# Patient Record
Sex: MALE | Race: WHITE | NOT HISPANIC OR LATINO | Employment: OTHER | URBAN - METROPOLITAN AREA
[De-identification: names, ages, dates, MRNs, and addresses within clinical notes are randomized per-mention and may not be internally consistent; named-entity substitution may affect disease eponyms.]

---

## 2018-01-01 ENCOUNTER — APPOINTMENT (OUTPATIENT)
Dept: INTERVENTIONAL RADIOLOGY/VASCULAR | Facility: HOSPITAL | Age: 73
DRG: 248 | End: 2018-01-01
Attending: INTERNAL MEDICINE
Payer: MEDICARE

## 2018-01-01 ENCOUNTER — APPOINTMENT (EMERGENCY)
Dept: INTERVENTIONAL RADIOLOGY/VASCULAR | Facility: HOSPITAL | Age: 73
DRG: 248 | End: 2018-01-01
Payer: MEDICARE

## 2018-01-01 ENCOUNTER — APPOINTMENT (EMERGENCY)
Dept: RADIOLOGY | Facility: HOSPITAL | Age: 73
DRG: 248 | End: 2018-01-01
Payer: MEDICARE

## 2018-01-01 ENCOUNTER — APPOINTMENT (INPATIENT)
Dept: RADIOLOGY | Facility: HOSPITAL | Age: 73
DRG: 248 | End: 2018-01-01
Payer: MEDICARE

## 2018-01-01 ENCOUNTER — HOSPITAL ENCOUNTER (INPATIENT)
Facility: HOSPITAL | Age: 73
LOS: 2 days | DRG: 248 | End: 2018-04-30
Attending: EMERGENCY MEDICINE | Admitting: ANESTHESIOLOGY
Payer: MEDICARE

## 2018-01-01 ENCOUNTER — APPOINTMENT (INPATIENT)
Dept: CT IMAGING | Facility: HOSPITAL | Age: 73
DRG: 248 | End: 2018-01-01
Payer: MEDICARE

## 2018-01-01 VITALS
BODY MASS INDEX: 32 KG/M2 | DIASTOLIC BLOOD PRESSURE: 65 MMHG | WEIGHT: 236.3 LBS | HEIGHT: 72 IN | RESPIRATION RATE: 16 BRPM | OXYGEN SATURATION: 62 % | SYSTOLIC BLOOD PRESSURE: 118 MMHG | HEART RATE: 117 BPM | TEMPERATURE: 95.7 F

## 2018-01-01 DIAGNOSIS — I21.3 STEMI (ST ELEVATION MYOCARDIAL INFARCTION) (HCC): ICD-10-CM

## 2018-01-01 DIAGNOSIS — I46.9 CARDIOPULMONARY ARREST (HCC): Primary | ICD-10-CM

## 2018-01-01 DIAGNOSIS — I46.9 CARDIAC ARREST (HCC): ICD-10-CM

## 2018-01-01 DIAGNOSIS — G40.901 STATUS EPILEPTICUS (HCC): ICD-10-CM

## 2018-01-01 DIAGNOSIS — G25.3 MYOCLONUS: ICD-10-CM

## 2018-01-01 LAB
ALBUMIN SERPL BCP-MCNC: 3.2 G/DL (ref 3.5–5)
ALBUMIN SERPL BCP-MCNC: 3.3 G/DL (ref 3.5–5)
ALP SERPL-CCNC: 103 U/L (ref 46–116)
ALP SERPL-CCNC: 103 U/L (ref 46–116)
ALT SERPL W P-5'-P-CCNC: 120 U/L (ref 12–78)
ALT SERPL W P-5'-P-CCNC: 91 U/L (ref 12–78)
ANION GAP BLD CALC-SCNC: 21 MMOL/L (ref 4–13)
ANION GAP SERPL CALCULATED.3IONS-SCNC: 11 MMOL/L (ref 4–13)
ANION GAP SERPL CALCULATED.3IONS-SCNC: 14 MMOL/L (ref 4–13)
ANION GAP SERPL CALCULATED.3IONS-SCNC: 14 MMOL/L (ref 4–13)
ANION GAP SERPL CALCULATED.3IONS-SCNC: 20 MMOL/L (ref 4–13)
APTT PPP: 25 SECONDS (ref 23–35)
ARTERIAL PATENCY WRIST A: YES
AST SERPL W P-5'-P-CCNC: 223 U/L (ref 5–45)
AST SERPL W P-5'-P-CCNC: 88 U/L (ref 5–45)
ATRIAL RATE: 108 BPM
ATRIAL RATE: 66 BPM
ATRIAL RATE: 70 BPM
BASE EXCESS BLDA CALC-SCNC: -6.2 MMOL/L
BASE EXCESS BLDA CALC-SCNC: -8 MMOL/L (ref -2–3)
BASE EXCESS BLDA CALC-SCNC: -8.7 MMOL/L
BASE EXCESS BLDA CALC-SCNC: -9 MMOL/L (ref -2–3)
BASOPHILS # BLD AUTO: 0.02 THOUSANDS/ΜL (ref 0–0.1)
BASOPHILS # BLD AUTO: 0.09 THOUSANDS/ΜL (ref 0–0.1)
BASOPHILS # BLD MANUAL: 0 THOUSAND/UL (ref 0–0.1)
BASOPHILS NFR BLD AUTO: 0 % (ref 0–1)
BASOPHILS NFR BLD AUTO: 1 % (ref 0–1)
BASOPHILS NFR MAR MANUAL: 0 % (ref 0–1)
BILIRUB SERPL-MCNC: 0.5 MG/DL (ref 0.2–1)
BILIRUB SERPL-MCNC: 0.6 MG/DL (ref 0.2–1)
BODY TEMPERATURE: 95.5 DEGREES FEHRENHEIT
BUN BLD-MCNC: 22 MG/DL (ref 5–25)
BUN SERPL-MCNC: 20 MG/DL (ref 5–25)
BUN SERPL-MCNC: 22 MG/DL (ref 5–25)
BUN SERPL-MCNC: 23 MG/DL (ref 5–25)
BUN SERPL-MCNC: 23 MG/DL (ref 5–25)
CA-I BLD-SCNC: 1.02 MMOL/L (ref 1.12–1.32)
CA-I BLD-SCNC: 1.03 MMOL/L (ref 1.12–1.32)
CA-I BLD-SCNC: 1.12 MMOL/L (ref 1.12–1.32)
CA-I BLD-SCNC: 1.13 MMOL/L (ref 1.12–1.32)
CA-I BLD-SCNC: 1.21 MMOL/L (ref 1.12–1.32)
CA-I BLD-SCNC: 1.24 MMOL/L (ref 1.12–1.32)
CALCIUM SERPL-MCNC: 7.6 MG/DL (ref 8.3–10.1)
CALCIUM SERPL-MCNC: 7.9 MG/DL (ref 8.3–10.1)
CALCIUM SERPL-MCNC: 8.2 MG/DL (ref 8.3–10.1)
CALCIUM SERPL-MCNC: 8.3 MG/DL (ref 8.3–10.1)
CHLORIDE BLD-SCNC: 108 MMOL/L (ref 100–108)
CHLORIDE SERPL-SCNC: 106 MMOL/L (ref 100–108)
CHLORIDE SERPL-SCNC: 108 MMOL/L (ref 100–108)
CHLORIDE SERPL-SCNC: 109 MMOL/L (ref 100–108)
CHLORIDE SERPL-SCNC: 109 MMOL/L (ref 100–108)
CO2 SERPL-SCNC: 16 MMOL/L (ref 21–32)
CO2 SERPL-SCNC: 19 MMOL/L (ref 21–32)
CREAT BLD-MCNC: 1.6 MG/DL (ref 0.6–1.3)
CREAT SERPL-MCNC: 1.32 MG/DL (ref 0.6–1.3)
CREAT SERPL-MCNC: 1.48 MG/DL (ref 0.6–1.3)
CREAT SERPL-MCNC: 1.57 MG/DL (ref 0.6–1.3)
CREAT SERPL-MCNC: 2 MG/DL (ref 0.6–1.3)
EOSINOPHIL # BLD AUTO: 0.01 THOUSAND/ΜL (ref 0–0.61)
EOSINOPHIL # BLD AUTO: 0.18 THOUSAND/ΜL (ref 0–0.61)
EOSINOPHIL # BLD MANUAL: 0 THOUSAND/UL (ref 0–0.4)
EOSINOPHIL NFR BLD AUTO: 0 % (ref 0–6)
EOSINOPHIL NFR BLD AUTO: 1 % (ref 0–6)
EOSINOPHIL NFR BLD MANUAL: 0 % (ref 0–6)
ERYTHROCYTE [DISTWIDTH] IN BLOOD BY AUTOMATED COUNT: 12.1 % (ref 11.6–15.1)
ERYTHROCYTE [DISTWIDTH] IN BLOOD BY AUTOMATED COUNT: 12.2 % (ref 11.6–15.1)
ERYTHROCYTE [DISTWIDTH] IN BLOOD BY AUTOMATED COUNT: 12.2 % (ref 11.6–15.1)
ERYTHROCYTE [DISTWIDTH] IN BLOOD BY AUTOMATED COUNT: 12.3 % (ref 11.6–15.1)
GFR SERPL CREATININE-BSD FRML MDRD: 32 ML/MIN/1.73SQ M
GFR SERPL CREATININE-BSD FRML MDRD: 42 ML/MIN/1.73SQ M
GFR SERPL CREATININE-BSD FRML MDRD: 43 ML/MIN/1.73SQ M
GFR SERPL CREATININE-BSD FRML MDRD: 46 ML/MIN/1.73SQ M
GFR SERPL CREATININE-BSD FRML MDRD: 53 ML/MIN/1.73SQ M
GLUCOSE SERPL-MCNC: 111 MG/DL (ref 65–140)
GLUCOSE SERPL-MCNC: 117 MG/DL (ref 65–140)
GLUCOSE SERPL-MCNC: 117 MG/DL (ref 65–140)
GLUCOSE SERPL-MCNC: 118 MG/DL (ref 65–140)
GLUCOSE SERPL-MCNC: 120 MG/DL (ref 65–140)
GLUCOSE SERPL-MCNC: 126 MG/DL (ref 65–140)
GLUCOSE SERPL-MCNC: 155 MG/DL (ref 65–140)
GLUCOSE SERPL-MCNC: 178 MG/DL (ref 65–140)
GLUCOSE SERPL-MCNC: 188 MG/DL (ref 65–140)
GLUCOSE SERPL-MCNC: 188 MG/DL (ref 65–140)
GLUCOSE SERPL-MCNC: 189 MG/DL (ref 65–140)
GLUCOSE SERPL-MCNC: 202 MG/DL (ref 65–140)
GLUCOSE SERPL-MCNC: 221 MG/DL (ref 65–140)
GLUCOSE SERPL-MCNC: 253 MG/DL (ref 65–140)
GLUCOSE SERPL-MCNC: 319 MG/DL (ref 65–140)
GLUCOSE SERPL-MCNC: 326 MG/DL (ref 65–140)
HCO3 BLDA-SCNC: 16.4 MMOL/L (ref 22–28)
HCO3 BLDA-SCNC: 17 MMOL/L (ref 22–28)
HCO3 BLDA-SCNC: 17.9 MMOL/L (ref 22–28)
HCO3 BLDA-SCNC: 18 MMOL/L (ref 22–28)
HCT VFR BLD AUTO: 36.5 % (ref 36.5–49.3)
HCT VFR BLD AUTO: 38.3 % (ref 36.5–49.3)
HCT VFR BLD AUTO: 38.8 % (ref 36.5–49.3)
HCT VFR BLD AUTO: 43.5 % (ref 36.5–49.3)
HCT VFR BLD CALC: 34 % (ref 36.5–49.3)
HCT VFR BLD CALC: 36 % (ref 36.5–49.3)
HCT VFR BLD CALC: 41 % (ref 36.5–49.3)
HGB BLD-MCNC: 12.6 G/DL (ref 12–17)
HGB BLD-MCNC: 13.5 G/DL (ref 12–17)
HGB BLD-MCNC: 13.5 G/DL (ref 12–17)
HGB BLD-MCNC: 14.5 G/DL (ref 12–17)
HGB BLDA-MCNC: 11.6 G/DL (ref 12–17)
HGB BLDA-MCNC: 12.2 G/DL (ref 12–17)
HGB BLDA-MCNC: 13.9 G/DL (ref 12–17)
HOROWITZ INDEX BLDA+IHG-RTO: 40 MM[HG]
HOROWITZ INDEX BLDA+IHG-RTO: 40 MM[HG]
INR PPP: 1.2 (ref 0.86–1.16)
INR PPP: 1.21 (ref 0.86–1.16)
INR PPP: 1.31 (ref 0.86–1.16)
INR PPP: 2.28 (ref 0.86–1.16)
LACTATE SERPL-SCNC: 2.2 MMOL/L (ref 0.5–2)
LACTATE SERPL-SCNC: 2.2 MMOL/L (ref 0.5–2)
LACTATE SERPL-SCNC: 4 MMOL/L (ref 0.5–2)
LACTATE SERPL-SCNC: 8.5 MMOL/L (ref 0.5–2)
LIPASE SERPL-CCNC: 68 U/L (ref 73–393)
LIPASE SERPL-CCNC: 68 U/L (ref 73–393)
LYMPHOCYTES # BLD AUTO: 0.46 THOUSANDS/ΜL (ref 0.6–4.47)
LYMPHOCYTES # BLD AUTO: 0.57 THOUSAND/UL (ref 0.6–4.47)
LYMPHOCYTES # BLD AUTO: 3 % (ref 14–44)
LYMPHOCYTES # BLD AUTO: 3.84 THOUSANDS/ΜL (ref 0.6–4.47)
LYMPHOCYTES NFR BLD AUTO: 2 % (ref 14–44)
LYMPHOCYTES NFR BLD AUTO: 26 % (ref 14–44)
MAGNESIUM SERPL-MCNC: 1.8 MG/DL (ref 1.6–2.6)
MAGNESIUM SERPL-MCNC: 2 MG/DL (ref 1.6–2.6)
MAGNESIUM SERPL-MCNC: 2.2 MG/DL (ref 1.6–2.6)
MAGNESIUM SERPL-MCNC: 2.4 MG/DL (ref 1.6–2.6)
MCH RBC QN AUTO: 32.7 PG (ref 26.8–34.3)
MCH RBC QN AUTO: 32.8 PG (ref 26.8–34.3)
MCH RBC QN AUTO: 32.9 PG (ref 26.8–34.3)
MCH RBC QN AUTO: 33.1 PG (ref 26.8–34.3)
MCHC RBC AUTO-ENTMCNC: 33.3 G/DL (ref 31.4–37.4)
MCHC RBC AUTO-ENTMCNC: 34.5 G/DL (ref 31.4–37.4)
MCHC RBC AUTO-ENTMCNC: 34.8 G/DL (ref 31.4–37.4)
MCHC RBC AUTO-ENTMCNC: 35.2 G/DL (ref 31.4–37.4)
MCV RBC AUTO: 93 FL (ref 82–98)
MCV RBC AUTO: 95 FL (ref 82–98)
MCV RBC AUTO: 95 FL (ref 82–98)
MCV RBC AUTO: 98 FL (ref 82–98)
MONOCYTES # BLD AUTO: 0.77 THOUSAND/ΜL (ref 0.17–1.22)
MONOCYTES # BLD AUTO: 0.95 THOUSAND/UL (ref 0–1.22)
MONOCYTES # BLD AUTO: 1.78 THOUSAND/ΜL (ref 0.17–1.22)
MONOCYTES NFR BLD AUTO: 5 % (ref 4–12)
MONOCYTES NFR BLD AUTO: 8 % (ref 4–12)
MONOCYTES NFR BLD: 5 % (ref 4–12)
NEUTROPHILS # BLD AUTO: 19.75 THOUSANDS/ΜL (ref 1.85–7.62)
NEUTROPHILS # BLD AUTO: 9.72 THOUSANDS/ΜL (ref 1.85–7.62)
NEUTROPHILS # BLD MANUAL: 17.06 THOUSAND/UL (ref 1.85–7.62)
NEUTS BAND NFR BLD MANUAL: 9 % (ref 0–8)
NEUTS SEG NFR BLD AUTO: 66 % (ref 43–75)
NEUTS SEG NFR BLD AUTO: 81 % (ref 43–75)
NEUTS SEG NFR BLD AUTO: 89 % (ref 43–75)
NRBC BLD AUTO-RTO: 0 /100 WBCS
NT-PROBNP SERPL-MCNC: 153 PG/ML
O2 CT BLDA-SCNC: 18.6 ML/DL (ref 16–23)
O2 CT BLDA-SCNC: 19.3 ML/DL (ref 16–23)
OXYHGB MFR BLDA: 92.6 % (ref 94–97)
OXYHGB MFR BLDA: 95.6 % (ref 94–97)
P AXIS: 75 DEGREES
P AXIS: 75 DEGREES
P AXIS: 78 DEGREES
PCO2 BLD: 18 MMOL/L (ref 21–32)
PCO2 BLD: 19 MMOL/L (ref 21–32)
PCO2 BLD: 19 MMOL/L (ref 21–32)
PCO2 BLD: 38.8 MM HG (ref 36–44)
PCO2 BLD: 40.7 MM HG (ref 36–44)
PCO2 BLDA: 27.7 MM HG (ref 36–44)
PCO2 BLDA: 33.2 MM HG (ref 36–44)
PEEP RESPIRATORY: 5 CM[H2O]
PEEP RESPIRATORY: 5 CM[H2O]
PH BLD: 7.25 [PH] (ref 7.35–7.45)
PH BLD: 7.27 [PH] (ref 7.35–7.45)
PH BLDA: 7.31 [PH] (ref 7.35–7.45)
PH BLDA: 7.41 [PH] (ref 7.35–7.45)
PHOSPHATE SERPL-MCNC: 2.1 MG/DL (ref 2.3–4.1)
PHOSPHATE SERPL-MCNC: 4.3 MG/DL (ref 2.3–4.1)
PHOSPHATE SERPL-MCNC: 4.6 MG/DL (ref 2.3–4.1)
PLATELET # BLD AUTO: 116 THOUSANDS/UL (ref 149–390)
PLATELET # BLD AUTO: 196 THOUSANDS/UL (ref 149–390)
PLATELET # BLD AUTO: 211 THOUSANDS/UL (ref 149–390)
PLATELET # BLD AUTO: 248 THOUSANDS/UL (ref 149–390)
PLATELET BLD QL SMEAR: ADEQUATE
PMV BLD AUTO: 11 FL (ref 8.9–12.7)
PMV BLD AUTO: 11.1 FL (ref 8.9–12.7)
PMV BLD AUTO: 11.3 FL (ref 8.9–12.7)
PMV BLD AUTO: 11.5 FL (ref 8.9–12.7)
PO2 BLD: 92 MM HG (ref 75–129)
PO2 BLD: 93 MM HG (ref 75–129)
PO2 BLDA: 72 MM HG (ref 75–129)
PO2 BLDA: 82.8 MM HG (ref 75–129)
POTASSIUM BLD-SCNC: 3.1 MMOL/L (ref 3.5–5.3)
POTASSIUM BLD-SCNC: 3.5 MMOL/L (ref 3.5–5.3)
POTASSIUM BLD-SCNC: 3.7 MMOL/L (ref 3.5–5.3)
POTASSIUM SERPL-SCNC: 3.1 MMOL/L (ref 3.5–5.3)
POTASSIUM SERPL-SCNC: 3.4 MMOL/L (ref 3.5–5.3)
POTASSIUM SERPL-SCNC: 3.5 MMOL/L (ref 3.5–5.3)
POTASSIUM SERPL-SCNC: 3.8 MMOL/L (ref 3.5–5.3)
PR INTERVAL: 176 MS
PR INTERVAL: 182 MS
PR INTERVAL: 218 MS
PROT SERPL-MCNC: 6 G/DL (ref 6.4–8.2)
PROT SERPL-MCNC: 6.3 G/DL (ref 6.4–8.2)
PROTHROMBIN TIME: 15.5 SECONDS (ref 12.1–14.4)
PROTHROMBIN TIME: 15.5 SECONDS (ref 12.1–14.4)
PROTHROMBIN TIME: 16.6 SECONDS (ref 12.1–14.4)
PROTHROMBIN TIME: 25.8 SECONDS (ref 12.1–14.4)
QRS AXIS: 101 DEGREES
QRS AXIS: 87 DEGREES
QRS AXIS: 96 DEGREES
QRSD INTERVAL: 106 MS
QRSD INTERVAL: 108 MS
QRSD INTERVAL: 112 MS
QT INTERVAL: 382 MS
QT INTERVAL: 436 MS
QT INTERVAL: 446 MS
QTC INTERVAL: 467 MS
QTC INTERVAL: 470 MS
QTC INTERVAL: 511 MS
RBC # BLD AUTO: 3.84 MILLION/UL (ref 3.88–5.62)
RBC # BLD AUTO: 4.08 MILLION/UL (ref 3.88–5.62)
RBC # BLD AUTO: 4.1 MILLION/UL (ref 3.88–5.62)
RBC # BLD AUTO: 4.44 MILLION/UL (ref 3.88–5.62)
SAO2 % BLD FROM PO2: 96 % (ref 95–98)
SAO2 % BLD FROM PO2: 96 % (ref 95–98)
SODIUM BLD-SCNC: 142 MMOL/L (ref 136–145)
SODIUM BLD-SCNC: 143 MMOL/L (ref 136–145)
SODIUM BLD-SCNC: 143 MMOL/L (ref 136–145)
SODIUM SERPL-SCNC: 139 MMOL/L (ref 136–145)
SODIUM SERPL-SCNC: 141 MMOL/L (ref 136–145)
SODIUM SERPL-SCNC: 142 MMOL/L (ref 136–145)
SODIUM SERPL-SCNC: 142 MMOL/L (ref 136–145)
SPECIMEN SOURCE: ABNORMAL
SPECIMEN SOURCE: NORMAL
T WAVE AXIS: 136 DEGREES
T WAVE AXIS: 41 DEGREES
T WAVE AXIS: 63 DEGREES
TOTAL CELLS COUNTED SPEC: 100
TROPONIN I BLD-MCNC: 0.01 NG/ML (ref 0–0.08)
TROPONIN I SERPL-MCNC: 0.04 NG/ML
TROPONIN I SERPL-MCNC: >40 NG/ML
VARIANT LYMPHS # BLD AUTO: 2 %
VENT AC: 22
VENT AC: 22
VENT- AC: AC
VENT- AC: AC
VENTRICULAR RATE: 108 BPM
VENTRICULAR RATE: 66 BPM
VENTRICULAR RATE: 70 BPM
VT SETTING VENT: 300 ML
VT SETTING VENT: 300 ML
WBC # BLD AUTO: 14.77 THOUSAND/UL (ref 4.31–10.16)
WBC # BLD AUTO: 18.95 THOUSAND/UL (ref 4.31–10.16)
WBC # BLD AUTO: 20.61 THOUSAND/UL (ref 4.31–10.16)
WBC # BLD AUTO: 22.17 THOUSAND/UL (ref 4.31–10.16)

## 2018-01-01 PROCEDURE — C1769 GUIDE WIRE: HCPCS | Performed by: INTERNAL MEDICINE

## 2018-01-01 PROCEDURE — 83605 ASSAY OF LACTIC ACID: CPT | Performed by: EMERGENCY MEDICINE

## 2018-01-01 PROCEDURE — 82805 BLOOD GASES W/O2 SATURATION: CPT | Performed by: NURSE PRACTITIONER

## 2018-01-01 PROCEDURE — C1894 INTRO/SHEATH, NON-LASER: HCPCS | Performed by: INTERNAL MEDICINE

## 2018-01-01 PROCEDURE — 5A1935Z RESPIRATORY VENTILATION, LESS THAN 24 CONSECUTIVE HOURS: ICD-10-PCS | Performed by: ANESTHESIOLOGY

## 2018-01-01 PROCEDURE — 92941 PRQ TRLML REVSC TOT OCCL AMI: CPT | Performed by: INTERNAL MEDICINE

## 2018-01-01 PROCEDURE — 82330 ASSAY OF CALCIUM: CPT | Performed by: NURSE PRACTITIONER

## 2018-01-01 PROCEDURE — C1757 CATH, THROMBECTOMY/EMBOLECT: HCPCS | Performed by: INTERNAL MEDICINE

## 2018-01-01 PROCEDURE — 85014 HEMATOCRIT: CPT

## 2018-01-01 PROCEDURE — B2111ZZ FLUOROSCOPY OF MULTIPLE CORONARY ARTERIES USING LOW OSMOLAR CONTRAST: ICD-10-PCS | Performed by: ANESTHESIOLOGY

## 2018-01-01 PROCEDURE — 83735 ASSAY OF MAGNESIUM: CPT | Performed by: EMERGENCY MEDICINE

## 2018-01-01 PROCEDURE — 85025 COMPLETE CBC W/AUTO DIFF WBC: CPT | Performed by: NURSE PRACTITIONER

## 2018-01-01 PROCEDURE — 71045 X-RAY EXAM CHEST 1 VIEW: CPT

## 2018-01-01 PROCEDURE — 80047 BASIC METABLC PNL IONIZED CA: CPT

## 2018-01-01 PROCEDURE — 84484 ASSAY OF TROPONIN QUANT: CPT | Performed by: NURSE PRACTITIONER

## 2018-01-01 PROCEDURE — 85610 PROTHROMBIN TIME: CPT | Performed by: EMERGENCY MEDICINE

## 2018-01-01 PROCEDURE — 85610 PROTHROMBIN TIME: CPT | Performed by: NURSE PRACTITIONER

## 2018-01-01 PROCEDURE — 02703ZZ DILATION OF CORONARY ARTERY, ONE ARTERY, PERCUTANEOUS APPROACH: ICD-10-PCS | Performed by: ANESTHESIOLOGY

## 2018-01-01 PROCEDURE — 82803 BLOOD GASES ANY COMBINATION: CPT

## 2018-01-01 PROCEDURE — 99222 1ST HOSP IP/OBS MODERATE 55: CPT | Performed by: INTERNAL MEDICINE

## 2018-01-01 PROCEDURE — 83605 ASSAY OF LACTIC ACID: CPT | Performed by: NURSE PRACTITIONER

## 2018-01-01 PROCEDURE — 83690 ASSAY OF LIPASE: CPT | Performed by: NURSE PRACTITIONER

## 2018-01-01 PROCEDURE — 93010 ELECTROCARDIOGRAM REPORT: CPT | Performed by: INTERNAL MEDICINE

## 2018-01-01 PROCEDURE — C1760 CLOSURE DEV, VASC: HCPCS | Performed by: INTERNAL MEDICINE

## 2018-01-01 PROCEDURE — 80048 BASIC METABOLIC PNL TOTAL CA: CPT | Performed by: NURSE PRACTITIONER

## 2018-01-01 PROCEDURE — 03HY32Z INSERTION OF MONITORING DEVICE INTO UPPER ARTERY, PERCUTANEOUS APPROACH: ICD-10-PCS | Performed by: ANESTHESIOLOGY

## 2018-01-01 PROCEDURE — 02703EZ DILATION OF CORONARY ARTERY, ONE ARTERY WITH TWO INTRALUMINAL DEVICES, PERCUTANEOUS APPROACH: ICD-10-PCS | Performed by: ANESTHESIOLOGY

## 2018-01-01 PROCEDURE — 94760 N-INVAS EAR/PLS OXIMETRY 1: CPT

## 2018-01-01 PROCEDURE — 99291 CRITICAL CARE FIRST HOUR: CPT

## 2018-01-01 PROCEDURE — 4A133J1 MONITORING OF ARTERIAL PULSE, PERIPHERAL, PERCUTANEOUS APPROACH: ICD-10-PCS | Performed by: ANESTHESIOLOGY

## 2018-01-01 PROCEDURE — 83735 ASSAY OF MAGNESIUM: CPT | Performed by: NURSE PRACTITIONER

## 2018-01-01 PROCEDURE — 85027 COMPLETE CBC AUTOMATED: CPT | Performed by: NURSE PRACTITIONER

## 2018-01-01 PROCEDURE — 85025 COMPLETE CBC W/AUTO DIFF WBC: CPT | Performed by: EMERGENCY MEDICINE

## 2018-01-01 PROCEDURE — 84484 ASSAY OF TROPONIN QUANT: CPT

## 2018-01-01 PROCEDURE — 93005 ELECTROCARDIOGRAM TRACING: CPT

## 2018-01-01 PROCEDURE — 84100 ASSAY OF PHOSPHORUS: CPT | Performed by: NURSE PRACTITIONER

## 2018-01-01 PROCEDURE — 70450 CT HEAD/BRAIN W/O DYE: CPT

## 2018-01-01 PROCEDURE — 05HM33Z INSERTION OF INFUSION DEVICE INTO RIGHT INTERNAL JUGULAR VEIN, PERCUTANEOUS APPROACH: ICD-10-PCS | Performed by: ANESTHESIOLOGY

## 2018-01-01 PROCEDURE — 82947 ASSAY GLUCOSE BLOOD QUANT: CPT

## 2018-01-01 PROCEDURE — 84132 ASSAY OF SERUM POTASSIUM: CPT

## 2018-01-01 PROCEDURE — C1887 CATHETER, GUIDING: HCPCS | Performed by: INTERNAL MEDICINE

## 2018-01-01 PROCEDURE — 93458 L HRT ARTERY/VENTRICLE ANGIO: CPT | Performed by: INTERNAL MEDICINE

## 2018-01-01 PROCEDURE — 94002 VENT MGMT INPAT INIT DAY: CPT

## 2018-01-01 PROCEDURE — 85007 BL SMEAR W/DIFF WBC COUNT: CPT | Performed by: NURSE PRACTITIONER

## 2018-01-01 PROCEDURE — 82948 REAGENT STRIP/BLOOD GLUCOSE: CPT

## 2018-01-01 PROCEDURE — 85730 THROMBOPLASTIN TIME PARTIAL: CPT | Performed by: EMERGENCY MEDICINE

## 2018-01-01 PROCEDURE — C1876 STENT, NON-COA/NON-COV W/DEL: HCPCS

## 2018-01-01 PROCEDURE — 80053 COMPREHEN METABOLIC PANEL: CPT | Performed by: EMERGENCY MEDICINE

## 2018-01-01 PROCEDURE — 84295 ASSAY OF SERUM SODIUM: CPT

## 2018-01-01 PROCEDURE — B2151ZZ FLUOROSCOPY OF LEFT HEART USING LOW OSMOLAR CONTRAST: ICD-10-PCS | Performed by: ANESTHESIOLOGY

## 2018-01-01 PROCEDURE — 84484 ASSAY OF TROPONIN QUANT: CPT | Performed by: EMERGENCY MEDICINE

## 2018-01-01 PROCEDURE — 82947 ASSAY GLUCOSE BLOOD QUANT: CPT | Performed by: NURSE PRACTITIONER

## 2018-01-01 PROCEDURE — 4A023N7 MEASUREMENT OF CARDIAC SAMPLING AND PRESSURE, LEFT HEART, PERCUTANEOUS APPROACH: ICD-10-PCS | Performed by: ANESTHESIOLOGY

## 2018-01-01 PROCEDURE — 80053 COMPREHEN METABOLIC PANEL: CPT | Performed by: NURSE PRACTITIONER

## 2018-01-01 PROCEDURE — 02C03ZZ EXTIRPATION OF MATTER FROM CORONARY ARTERY, ONE ARTERY, PERCUTANEOUS APPROACH: ICD-10-PCS | Performed by: ANESTHESIOLOGY

## 2018-01-01 PROCEDURE — 83880 ASSAY OF NATRIURETIC PEPTIDE: CPT | Performed by: EMERGENCY MEDICINE

## 2018-01-01 PROCEDURE — C1725 CATH, TRANSLUMIN NON-LASER: HCPCS | Performed by: INTERNAL MEDICINE

## 2018-01-01 PROCEDURE — 4A133B1 MONITORING OF ARTERIAL PRESSURE, PERIPHERAL, PERCUTANEOUS APPROACH: ICD-10-PCS | Performed by: ANESTHESIOLOGY

## 2018-01-01 PROCEDURE — 82330 ASSAY OF CALCIUM: CPT

## 2018-01-01 PROCEDURE — 93005 ELECTROCARDIOGRAM TRACING: CPT | Performed by: INTERNAL MEDICINE

## 2018-01-01 RX ORDER — PROPOFOL 10 MG/ML
5-50 INJECTION, EMULSION INTRAVENOUS
Status: DISCONTINUED | OUTPATIENT
Start: 2018-01-01 | End: 2018-01-01

## 2018-01-01 RX ORDER — HEPARIN SODIUM 1000 [USP'U]/ML
4000 INJECTION, SOLUTION INTRAVENOUS; SUBCUTANEOUS ONCE
Status: DISCONTINUED | OUTPATIENT
Start: 2018-01-01 | End: 2018-01-01

## 2018-01-01 RX ORDER — GLYCOPYRROLATE 0.2 MG/ML
0.1 INJECTION INTRAMUSCULAR; INTRAVENOUS ONCE
Status: COMPLETED | OUTPATIENT
Start: 2018-01-01 | End: 2018-01-01

## 2018-01-01 RX ORDER — LORAZEPAM 2 MG/ML
2 INJECTION INTRAMUSCULAR
Status: DISCONTINUED | OUTPATIENT
Start: 2018-01-01 | End: 2018-01-01 | Stop reason: HOSPADM

## 2018-01-01 RX ORDER — HEPARIN SODIUM 1000 [USP'U]/ML
4000 INJECTION, SOLUTION INTRAVENOUS; SUBCUTANEOUS AS NEEDED
Status: DISCONTINUED | OUTPATIENT
Start: 2018-01-01 | End: 2018-01-01

## 2018-01-01 RX ORDER — EPTIFIBATIDE 0.75 MG/ML
INJECTION, SOLUTION INTRAVENOUS
Status: COMPLETED | OUTPATIENT
Start: 2018-01-01 | End: 2018-01-01

## 2018-01-01 RX ORDER — EPTIFIBATIDE 20 MG/10ML
INJECTION INTRAVENOUS CODE/TRAUMA/SEDATION MEDICATION
Status: COMPLETED | OUTPATIENT
Start: 2018-01-01 | End: 2018-01-01

## 2018-01-01 RX ORDER — SCOLOPAMINE TRANSDERMAL SYSTEM 1 MG/1
1 PATCH, EXTENDED RELEASE TRANSDERMAL
Status: DISCONTINUED | OUTPATIENT
Start: 2018-01-01 | End: 2018-01-01 | Stop reason: HOSPADM

## 2018-01-01 RX ORDER — LORAZEPAM 2 MG/ML
INJECTION INTRAMUSCULAR
Status: DISPENSED
Start: 2018-01-01 | End: 2018-01-01

## 2018-01-01 RX ORDER — MORPHINE SULFATE 2 MG/ML
2 INJECTION, SOLUTION INTRAMUSCULAR; INTRAVENOUS
Status: DISCONTINUED | OUTPATIENT
Start: 2018-01-01 | End: 2018-01-01 | Stop reason: HOSPADM

## 2018-01-01 RX ORDER — MAGNESIUM SULFATE 1 G/100ML
1 INJECTION INTRAVENOUS ONCE
Status: COMPLETED | OUTPATIENT
Start: 2018-01-01 | End: 2018-01-01

## 2018-01-01 RX ORDER — SODIUM CHLORIDE 9 MG/ML
75 INJECTION, SOLUTION INTRAVENOUS CONTINUOUS
Status: DISCONTINUED | OUTPATIENT
Start: 2018-01-01 | End: 2018-01-01

## 2018-01-01 RX ORDER — CHLORHEXIDINE GLUCONATE 0.12 MG/ML
15 RINSE ORAL EVERY 12 HOURS SCHEDULED
Status: DISCONTINUED | OUTPATIENT
Start: 2018-01-01 | End: 2018-01-01

## 2018-01-01 RX ORDER — MORPHINE SULFATE 10 MG/ML
5 INJECTION, SOLUTION INTRAMUSCULAR; INTRAVENOUS ONCE
Status: COMPLETED | OUTPATIENT
Start: 2018-01-01 | End: 2018-01-01

## 2018-01-01 RX ORDER — HEPARIN SODIUM 10000 [USP'U]/100ML
3-20 INJECTION, SOLUTION INTRAVENOUS
Status: DISCONTINUED | OUTPATIENT
Start: 2018-01-01 | End: 2018-01-01

## 2018-01-01 RX ORDER — LIDOCAINE HYDROCHLORIDE 10 MG/ML
INJECTION, SOLUTION INFILTRATION; PERINEURAL CODE/TRAUMA/SEDATION MEDICATION
Status: COMPLETED | OUTPATIENT
Start: 2018-01-01 | End: 2018-01-01

## 2018-01-01 RX ORDER — LORAZEPAM 2 MG/ML
2 INJECTION INTRAMUSCULAR ONCE
Status: COMPLETED | OUTPATIENT
Start: 2018-01-01 | End: 2018-01-01

## 2018-01-01 RX ORDER — LORAZEPAM 2 MG/ML
2 INJECTION INTRAMUSCULAR ONCE
Status: DISCONTINUED | OUTPATIENT
Start: 2018-01-01 | End: 2018-01-01

## 2018-01-01 RX ORDER — HEPARIN SODIUM 1000 [USP'U]/ML
2000 INJECTION, SOLUTION INTRAVENOUS; SUBCUTANEOUS AS NEEDED
Status: DISCONTINUED | OUTPATIENT
Start: 2018-01-01 | End: 2018-01-01

## 2018-01-01 RX ORDER — LORAZEPAM 2 MG/ML
1 INJECTION INTRAMUSCULAR
Status: DISCONTINUED | OUTPATIENT
Start: 2018-01-01 | End: 2018-01-01

## 2018-01-01 RX ORDER — ASPIRIN 300 MG/1
SUPPOSITORY RECTAL
Status: COMPLETED
Start: 2018-01-01 | End: 2018-01-01

## 2018-01-01 RX ORDER — ASPIRIN 300 MG/1
600 SUPPOSITORY RECTAL ONCE
Status: COMPLETED | OUTPATIENT
Start: 2018-01-01 | End: 2018-01-01

## 2018-01-01 RX ORDER — ONDANSETRON 2 MG/ML
4 INJECTION INTRAMUSCULAR; INTRAVENOUS EVERY 6 HOURS PRN
Status: DISCONTINUED | OUTPATIENT
Start: 2018-01-01 | End: 2018-01-01

## 2018-01-01 RX ORDER — ASPIRIN 81 MG/1
162 TABLET, CHEWABLE ORAL DAILY
Status: DISCONTINUED | OUTPATIENT
Start: 2018-01-01 | End: 2018-01-01

## 2018-01-01 RX ADMIN — LORAZEPAM 2 MG: 2 INJECTION, SOLUTION INTRAMUSCULAR; INTRAVENOUS at 21:25

## 2018-01-01 RX ADMIN — Medication 1.73 MG/KG/HR: at 14:03

## 2018-01-01 RX ADMIN — DEXTROSE MONOHYDRATE 1 MG/MIN: 50 INJECTION, SOLUTION INTRAVENOUS at 13:33

## 2018-01-01 RX ADMIN — EPTIFIBATIDE 9.5 ML: 2 INJECTION, SOLUTION INTRAVENOUS at 14:16

## 2018-01-01 RX ADMIN — ASPIRIN 600 MG: 300 SUPPOSITORY RECTAL at 12:57

## 2018-01-01 RX ADMIN — MORPHINE SULFATE 5 MG: 10 INJECTION INTRAVENOUS at 04:35

## 2018-01-01 RX ADMIN — EPTIFIBATIDE 2 MCG/KG/MIN: 0.75 INJECTION, SOLUTION INTRAVENOUS at 14:12

## 2018-01-01 RX ADMIN — CALCIUM GLUCONATE 2 G: 98 INJECTION, SOLUTION INTRAVENOUS at 20:27

## 2018-01-01 RX ADMIN — SODIUM CHLORIDE 1 UNITS/HR: 9 INJECTION, SOLUTION INTRAVENOUS at 22:21

## 2018-01-01 RX ADMIN — PROPOFOL 50 MCG/KG/MIN: 10 INJECTION, EMULSION INTRAVENOUS at 07:05

## 2018-01-01 RX ADMIN — LEVETIRACETAM 1000 MG: 100 INJECTION, SOLUTION INTRAVENOUS at 18:27

## 2018-01-01 RX ADMIN — AMIODARONE HYDROCHLORIDE 1 MG/MIN: 50 INJECTION, SOLUTION INTRAVENOUS at 13:33

## 2018-01-01 RX ADMIN — MAGNESIUM SULFATE HEPTAHYDRATE 1 G: 1 INJECTION, SOLUTION INTRAVENOUS at 20:32

## 2018-01-01 RX ADMIN — MORPHINE SULFATE 5 MG: 10 INJECTION INTRAVENOUS at 08:35

## 2018-01-01 RX ADMIN — GLYCOPYRROLATE 0.1 MG: 0.2 INJECTION, SOLUTION INTRAMUSCULAR; INTRAVENOUS at 10:05

## 2018-01-01 RX ADMIN — CHLORHEXIDINE GLUCONATE 15 ML: 1.2 RINSE ORAL at 20:33

## 2018-01-01 RX ADMIN — MORPHINE SULFATE 2 MG: 2 INJECTION, SOLUTION INTRAMUSCULAR; INTRAVENOUS at 23:46

## 2018-01-01 RX ADMIN — MORPHINE SULFATE 2 MG: 2 INJECTION, SOLUTION INTRAMUSCULAR; INTRAVENOUS at 11:25

## 2018-01-01 RX ADMIN — EPTIFIBATIDE 9.5 ML: 2 INJECTION, SOLUTION INTRAVENOUS at 14:05

## 2018-01-01 RX ADMIN — NOREPINEPHRINE BITARTRATE 2 MCG/MIN: 1 INJECTION INTRAVENOUS at 00:20

## 2018-01-01 RX ADMIN — MORPHINE SULFATE 2 MG: 2 INJECTION, SOLUTION INTRAMUSCULAR; INTRAVENOUS at 20:42

## 2018-01-01 RX ADMIN — VALPROATE SODIUM 2140 MG: 100 INJECTION, SOLUTION INTRAVENOUS at 20:27

## 2018-01-01 RX ADMIN — SODIUM CHLORIDE 75 ML/HR: 0.9 INJECTION, SOLUTION INTRAVENOUS at 17:43

## 2018-01-01 RX ADMIN — SODIUM CHLORIDE 1000 ML: 0.9 INJECTION, SOLUTION INTRAVENOUS at 01:15

## 2018-01-01 RX ADMIN — PROPOFOL 20 MCG/KG/MIN: 10 INJECTION, EMULSION INTRAVENOUS at 22:16

## 2018-01-01 RX ADMIN — MORPHINE SULFATE 2 MG: 2 INJECTION, SOLUTION INTRAMUSCULAR; INTRAVENOUS at 12:46

## 2018-01-01 RX ADMIN — SODIUM PHOSPHATE, MONOBASIC, MONOHYDRATE 12 MMOL: 276; 142 INJECTION, SOLUTION INTRAVENOUS at 20:32

## 2018-01-01 RX ADMIN — SCOPALAMINE 1 PATCH: 1 PATCH, EXTENDED RELEASE TRANSDERMAL at 08:31

## 2018-01-01 RX ADMIN — LORAZEPAM 2 MG: 2 INJECTION, SOLUTION INTRAMUSCULAR; INTRAVENOUS at 17:02

## 2018-01-01 RX ADMIN — Medication 20 MG: at 18:27

## 2018-01-01 RX ADMIN — Medication 2 MG/HR: at 09:49

## 2018-01-01 RX ADMIN — MORPHINE SULFATE 2 MG: 2 INJECTION, SOLUTION INTRAMUSCULAR; INTRAVENOUS at 09:01

## 2018-01-01 RX ADMIN — TICAGRELOR 180 MG: 90 TABLET ORAL at 14:37

## 2018-01-01 RX ADMIN — LORAZEPAM 1 MG: 2 INJECTION INTRAMUSCULAR; INTRAVENOUS at 11:23

## 2018-01-01 RX ADMIN — PROPOFOL 50 MCG/KG/MIN: 10 INJECTION, EMULSION INTRAVENOUS at 01:36

## 2018-01-01 RX ADMIN — IOHEXOL 180 ML: 350 INJECTION, SOLUTION INTRAVENOUS at 14:37

## 2018-01-01 RX ADMIN — LIDOCAINE HYDROCHLORIDE 10 ML: 10 INJECTION, SOLUTION INFILTRATION; PERINEURAL at 13:53

## 2018-01-01 RX ADMIN — LORAZEPAM 1 MG: 2 INJECTION INTRAMUSCULAR; INTRAVENOUS at 11:29

## 2018-04-28 PROBLEM — E87.2 LACTIC ACIDOSIS: Status: ACTIVE | Noted: 2018-01-01

## 2018-04-28 PROBLEM — G93.40 ENCEPHALOPATHY ACUTE: Status: ACTIVE | Noted: 2018-01-01

## 2018-04-28 PROBLEM — J96.02 ACUTE RESPIRATORY FAILURE WITH HYPOXIA AND HYPERCAPNIA (HCC): Status: ACTIVE | Noted: 2018-01-01

## 2018-04-28 PROBLEM — E87.2 HIGH ANION GAP METABOLIC ACIDOSIS: Status: ACTIVE | Noted: 2018-01-01

## 2018-04-28 PROBLEM — N17.9 AKI (ACUTE KIDNEY INJURY) (HCC): Status: ACTIVE | Noted: 2018-01-01

## 2018-04-28 PROBLEM — I46.9 CARDIAC ARREST (HCC): Status: ACTIVE | Noted: 2018-01-01

## 2018-04-28 PROBLEM — R74.01 TRANSAMINITIS: Status: ACTIVE | Noted: 2018-01-01

## 2018-04-28 PROBLEM — J96.01 ACUTE RESPIRATORY FAILURE WITH HYPOXIA AND HYPERCAPNIA (HCC): Status: ACTIVE | Noted: 2018-01-01

## 2018-04-28 NOTE — CONSULTS
Consultation - Cardiology   Atilio Huerta 68 y o  male MRN: [id number]  : 1945  Unit/Bed#:  Encounter: [id number]      Assessment & Plan   1  Acute MI type 1 with ST elevation in precordial leads leading to cardiac arrest status post CPR and defibrillation, currently intubated  Not sure patient has underlying anoxic brain encephalopathy  2   Ventricle dependent respiratory failure secondary to above  3  Status post cardiac arrest   4  Coronary artery disease status post catheterization with 100% occluded LAD which was successfully stented with a non drug coated stents  5   Hypokalemia  6  CKD, previous serum creatinine numbers not known    Summary of Recommendations:        Ordered cardiac catheterization performed with good result  Aspirin  Brilinta  Right common femoral artery groin care  Vent management as per ICU team  Follow-up electrolytes  Monitor input output  Ace inhibitors and beta-blockers as tolerated and based on kidney function  Statin therapy  Monitor for acute kidney injury  Discussed with ICU team, ER at length  Physician Requesting Consult: Nicki Philippe DO    Reason for Consult / Principal Problem:  Acute ST elevation MI status post cardiac arrest    Inpatient consult to Cardiology  Consult performed by: Didi Del Rosario ordered by: Olimpia Shabazz          HPI: Atilio Huerta is a 68y o  year old male who has no prior cardiac history was out hunting when he collapsed and ambulance was called  Patient was found to be in ventricular fibrillation and he was shocked in PEA  He received epi he went back into ventricular arrhythmia he was shocked again since then he has been in sinus rhythm  He had a CPR done for about 5 minutes as per ED attending  EKG shows he had a ST elevation in precordial leads hence STEMI was called  When seen by me patient was already intubated and was in the cath lab    He underwent successful cardiac catheterization where his mid LAD was opened with non drug coated stent  Two stents were placed  Patient has not seen a doctor for the last and 50 year  He does not smoke  No other significant history is available  In the ED patient was started on amiodarone drip  He has already received aspirin, given Integrilin in the cath lab and later on started on Brilinta  He had a Reddy in and there was a hematuria noted  Thought to be traumatic  Patient is under the care of ICU critical care team      Review of Systems     Detailed review of system is not possible as patient was intubated    Historical Information   No past medical history on file  No past surgical history on file  History   Alcohol use Not on file     History   Drug use: Unknown     History   Smoking Status    Not on file   Smokeless Tobacco    Not on file     Family History: No family history on file  Meds/Allergies    PTA meds:    No prescriptions prior to admission  No Known Allergies    Current Facility-Administered Medications:     amiodarone (CORDARONE) 900 mg in dextrose 5 % 500 mL infusion, 1 mg/min, Intravenous, Continuous, Hyacinth Hernández MD    aspirin chewable tablet 162 mg, 162 mg, Oral, Daily, Dillon Rose MD    chlorhexidine (PERIDEX) 0 12 % oral rinse 15 mL, 15 mL, Swish & Spit, Q12H VIVIENNE, OMARI Cueva    insulin lispro (HumaLOG) 100 units/mL subcutaneous injection 1-5 Units, 1-5 Units, Subcutaneous, Q6H VIVIENNE **AND** Fingerstick Glucose (POCT), , , Q6H, OMARI Cueva    omeprazole (PRILOSEC) suspension 2 mg/mL, 20 mg, Oral, Daily, OMARI Cueva    ondansetron (ZOFRAN) injection 4 mg, 4 mg, Intravenous, Q6H PRN, Dillon Rose MD    sodium chloride 0 9 % infusion, 75 mL/hr, Intravenous, Continuous, Dillon Rose MD    ticagrelor (BRILINTA) tablet 90 mg, 90 mg, Oral, BID, Dillon Rose MD        Objective:   Vitals: Blood pressure 100/55, pulse 79, temperature (!) 92 8 °F (33 8 °C), temperature source Probe, resp   rate (!) 24, height 6' (1 829 m), weight 107 kg (236 lb 4 8 oz), SpO2 100 %  Body mass index is 32 05 kg/m²  BP Readings from Last 3 Encounters:   04/28/18 100/55     Orthostatic Blood Pressures      Most Recent Value   Blood Pressure  100/55 filed at 04/28/2018 1320          Intake/Output Summary (Last 24 hours) at 04/28/18 1545  Last data filed at 04/28/18 1536   Gross per 24 hour   Intake                0 ml   Output              350 ml   Net             -350 ml       Invasive Devices     Peripheral Intravenous Line            Peripheral IV 04/28/18 Right Antecubital less than 1 day          Drain            Urethral Catheter less than 1 day          Airway            ETT  less than 1 day                  Physical Exam:   Physical Exam    Neurologic:  Intubated, on ventilator  Constitutional:  Well developed, well nourished, non-toxic appearance   Respiratory:  Bilateral air entry, clear to auscultation anterior  Cardiovascular: S1-S2 regular with a 2/6 ejection systolic murmur and S4 is present  GI:  Soft, nondistended, normal bowel sounds, nontender, no hepatosplenomegaly appreciated  Musculoskeletal:  No edema, no tenderness, no deformities     Skin:  Well hydrated, no rash   Lymphatic:  No lymphadenopathy noted   Extremities:  No edema and distal pulses are present    Labs:   Troponins:     Results from last 7 days  Lab Units 04/28/18  1250   TROPONIN I ng/mL 0 04       CBC with diff:     Results from last 7 days  Lab Units 04/28/18  1539 04/28/18  1250 04/28/18  1248   WBC Thousand/uL 20 61* 14 77*  --    HEMOGLOBIN g/dL 13 5 14 5  --    I STAT HEMOGLOBIN g/dl  --   --  13 9   HEMATOCRIT % 38 8 43 5  --    MCV fL 95 98  --    PLATELETS Thousands/uL 116* 248  --    MCH pg 33 1 32 7  --    MCHC g/dL 34 8 33 3  --    RDW % 12 2 12 1  --    MPV fL 11 3 11 5  --    NRBC AUTO /100 WBCs  --  0  --        CMP:     Results from last 7 days  Lab Units 04/28/18  1515 04/28/18  1250 04/28/18  1248   SODIUM mmol/L 141 142  -- POTASSIUM mmol/L 3 4* 3 1*  --    CHLORIDE mmol/L 108 106  --    CO2 mmol/L 19* 16*  --    ANION GAP mmol/L 14* 20*  --    BUN mg/dL 20 23  --    CREATININE mg/dL 1 57* 2 00*  --    GLUCOSE RANDOM mg/dL 202* 326*  --    GLUCOSE, ISTAT mg/dl  --   --  319*   CALCIUM mg/dL 8 2* 8 3  --    AST U/L 223* 88*  --    ALT U/L 120* 91*  --    ALK PHOS U/L 103 103  --    TOTAL PROTEIN g/dL 6 0* 6 3*  --    BILIRUBIN TOTAL mg/dL 0 60 0 50  --    EGFR ml/min/1 73sq m 43 32 42       Magnesium:     Results from last 7 days  Lab Units 04/28/18  1515 04/28/18  1250   MAGNESIUM mg/dL 2 2 2 4     Coags:     Results from last 7 days  Lab Units 04/28/18  1250   PTT seconds 25   INR  1 21*     NT-proBNP:   Recent Labs      04/28/18   1250   NTBNP  153*        Imaging & Testing   Cardiac testing:     Recent cardiac catheterization shows patient has with nonobstructive disease of circumflex and RCA  Darek Lopezta is a large-sized artery it has 2 sequential lesion in mid and distal with 100% occlusion and CARMEN 0 flow  After catheterization patient has successful CARMEN 3 flow  EF appears to be 30-35% with anterior apical akinesis  Imaging: I have personally reviewed pertinent reports  Xr Chest 1 View Portable    Result Date: 4/28/2018  Narrative: CHEST INDICATION:   cardiac arrest  COMPARISON:  None EXAM PERFORMED/VIEWS:  XR CHEST PORTABLE FINDINGS:  Endotracheal tube is present, in satisfactory position with its tip above the level of the frantz  Enteric tube is present with its tip extending below the left hemidiaphragm  Overlying structures limit evaluation  The left costophrenic angle is cut off  Cardiomediastinal silhouette appears unremarkable  The lungs are clear  No pneumothorax or pleural effusion  Osseous structures appear within normal limits for patient age  Impression: No acute cardiopulmonary disease  Workstation performed: [id number]     EKG/ Monitor: Personally reviewed     Normal sinus rhythm with acute MI and ST elevation in precordial leads  Code Status: Level 1 - Full Code  Advance Directive and Living Will:      POLST:      Dr Yaa Marino MD [facility] [place]      "This note has been constructed using a voice recognition system  Therefore there may be syntax, spelling, and/or grammatical errors   Please call if you have any questions  "

## 2018-04-28 NOTE — RESPIRATORY THERAPY NOTE
68year old male post cardiac arrest arrived by EMS intubated pre-hospital  Upon arrival pt was being bagged with 100% FiO2 by EMS  ET tube was at 27cm at the lip upon arrival  Bilateral breath sounds heard upon auscultation  Pt placed on ventilator and ET tube was secured with tube steve  ET tube was pulled back to 24cm at the lip per Dr Tam Hollis request  Pt was then transported to the cath lab on Drager  Pt was then bagged up to the ICU and placed on Drager  Pt was placed on MRI ventilator and was transported down to CT scan  Pt transported down and back to CT scan without incident  Upon return to the ICU pt was placed back on Drager  Will continue to monitor patient  Plan: continue current support until further orders

## 2018-04-28 NOTE — H&P
History and Physical - Critical Care  Supa Mendez 68 y o  male MRN: [id number]  Unit/Bed#: [facility] Encounter: [id number]     Reason for Admission / Chief Complaint: s/p cardiac arrest      History of Present Illness:  Supa Mendez is a 68 y o  male who presents to the ED via EMS after a witnessed cardiac arrest   Per his family's report he had been out hunting and he was complaining of feeling tired  He had been in his normal state of health up until that point  He was able to get out of the woods and into the car on his own  His son reports that he seemed to be "in and out of it" and was repeating things and not making sense  He then clenched his left arm and became unconscious  His son pulled over and called 46  When EMS arrived they immediately started CPR and he was found to be in [address] [place] [address]  ACLS protocol was followed and he had return of ROSC by arrival to the ED  EKG showed a STEMI and he was taken emergently to the cath lab  He was found to have an LAD lesion and a stent was placed  He was transferred to the ICU post procedure  Per his family he has no known medical problems, but does not seek medical care  History obtained from chart review and unobtainable from patient due to mental status  Past Medical History:  No past medical history on file  Past Surgical History:  No past surgical history on file  Past Family History:  No family history on file       Social History:  History   Smoking Status    Not on file   Smokeless Tobacco    Not on file     History   Alcohol use Not on file     History   Drug use: Unknown     Marital Status:        Medications:  Current Facility-Administered Medications   Medication Dose Route Frequency    amiodarone (CORDARONE) 900 mg in dextrose 5 % 500 mL infusion  1 mg/min Intravenous Continuous    amiodarone (CORDARONE) 900 mg in dextrose 5 % 500 mL infusion    Code/Trauma/Sedation Continuous Med    amiodarone 300 mg in dextrose 5 % 100 mL IV bolus  300 mg Intravenous Once    bivalirudin (ANGIOMAX) 250 mg in sodium chloride 0 9 % 50 mL infusion   Intravenous Code/Trauma/Sedation Continuous Med    bivalirudin (ANGIOMAX) bolus from bag    Code/Trauma/Sedation Med    eptifibatide (INTEGRILIN) 20 MG/10ML (premix)   Intravenous Code/Trauma/Sedation Med    heparin (porcine) 25,000 units in 250 mL infusion (premix)  3-20 Units/kg/hr (Order-Specific) Intravenous Titrated    heparin (porcine) injection 2,000 Units  2,000 Units Intravenous PRN    heparin (porcine) injection 4,000 Units  4,000 Units Intravenous Once    heparin (porcine) injection 4,000 Units  4,000 Units Intravenous PRN    lidocaine (XYLOCAINE) 1 % injection    Code/Trauma/Sedation Med     Home medications:  Prior to Admission medications    Not on File     Allergies:  No Known Allergies     ROS:   Review of Systems   Unable to perform ROS: Intubated        Vitals:  Vitals:    18 1259 18 1259 18 1309 18 1311   BP:  100/55 95/51 100/66   Pulse:  86 81 79   Resp:  22 (!) 24 (!) 24   Temp: (!) 92 8 °F (33 8 °C)      TempSrc: Probe      SpO2:  99% 100% 100%   Weight:       Height:         Temperature:   Temp (24hrs), Av 8 °F (33 8 °C), Min:92 8 °F (33 8 °C), Max:92 8 °F (33 8 °C)    Current: Temperature: (!) 92 8 °F (33 8 °C)     Weights:   IBW: 77 6 kg  Body mass index is 32 05 kg/m²  Hemodynamic Monitoring:  N/A     Non-Invasive/Invasive Ventilation Settings:  Respiratory    Lab Data (Last 4 hours)    None         O2/Vent Data (Last 4 hours)    None              No results found for: PHART, NZB0FGX, PO2ART, BBM1EPU, R9WCSTVT, BEART, SOURCE  SpO2: SpO2: 100 %     Physical Exam:  Physical Exam   Constitutional: He appears ill  No distress  He is intubated  HENT:   Head: Normocephalic and atraumatic  Eyes: Conjunctivae are normal  Pupils are equal, round, and reactive to light  Neck: Neck supple  No JVD present  No thyromegaly present     Cardiovascular: Normal rate, regular rhythm and normal heart sounds  Exam reveals no gallop and no friction rub  No murmur heard  Pulmonary/Chest: Effort normal  He is intubated  No respiratory distress  He has decreased breath sounds  Abdominal: Soft  Bowel sounds are normal  He exhibits no distension  There is no tenderness  Genitourinary:   Genitourinary Comments: Reddy noted with hematuria  Musculoskeletal: He exhibits no edema  Lymphadenopathy:     He has no cervical adenopathy  Neurological: He is unresponsive  No cranial nerve deficit  GCS eye subscore is 1  GCS verbal subscore is 1  GCS motor subscore is 1  Gaze noted to be upward and right lateral   Myoclonic jerking of the left arm noted  No cough or gag reflex  +corneal reflex  Skin: Skin is warm and dry  No rash noted  No erythema  Labs:    Results from last 7 days  Lab Units 04/28/18  1250 04/28/18  1248   WBC Thousand/uL 14 77*  --    HEMOGLOBIN g/dL 14 5  --    I STAT HEMOGLOBIN g/dl  --  13 9   HEMATOCRIT % 43 5  --    PLATELETS Thousands/uL 248  --    NEUTROS PCT % 66  --    MONOS PCT % 5  --       Results from last 7 days  Lab Units 04/28/18  1250 04/28/18  1248   SODIUM mmol/L 142  --    POTASSIUM mmol/L 3 1*  --    CHLORIDE mmol/L 106  --    CO2 mmol/L 16*  --    BUN mg/dL 23  --    CREATININE mg/dL 2 00*  --    CALCIUM mg/dL 8 3  --    TOTAL PROTEIN g/dL 6 3*  --    BILIRUBIN TOTAL mg/dL 0 50  --    ALK PHOS U/L 103  --    ALT U/L 91*  --    AST U/L 88*  --    GLUCOSE RANDOM mg/dL 326*  --    GLUCOSE, ISTAT mg/dl  --  319*       Results from last 7 days  Lab Units 04/28/18  1250   MAGNESIUM mg/dL 2 4            Results from last 7 days  Lab Units 04/28/18  1250   INR  1 21*   PTT seconds 25       Results from last 7 days  Lab Units 04/28/18  1250   LACTIC ACID mmol/L 8 5*       0  Lab Value Date/Time   TROPONINI 0 04 04/28/2018 1250        Imaging: CXR shows pulmonary edema, ETT is in appropriate position    I have personally reviewed pertinent films in PACS  EKG: NSR- This was personally reviewed by myself  Micro:  No results found for: Hattie Mckeon, SPUTUMCULTUR    Assessment:   1  S/p Cardiac arrest-vfib   2  Acute resp failure with hypoxia and hypercapnia   3  MABEL  4  Lactic acidosis  5  Acute encephalopathy  6  Transaminitis  7  Elevated anion gap metabolic acidosis  8  STEMI  9  Leukocytosis       Plan:                  Neuro:   Acute encephalopathy-2/2 cardiac arrest-will initiate targeted temperature management-goal temp 36 degrees, monitor neuro status closely, serial neuro exams  Noted on arrival to the ICU to have myoclonic jerks of his left arm  Also noted to have upward and right lateral gaze preference  Stat CT head ordered  [facility] ICU  Sleep hygiene                 CV:   s/p Vfib cardiac arrest/STEMI-had ROSC upon arrival to ED  Taken emergently to cath lab, had stent placed to LAD  Transferred to ICU on Cangrelor and Integrilin, will stop cangrelor  Received dose of Brilinta in the cath lab as well  Continue AMio drip  Lung:   Acute hypoxic and hypercarbic resp failure-2/2 cardiac arrest-continue mechanical ventilation for now, will check ABG and make vent changes accordingly                 GI:   Transaminitis-22/ shock liver from arrest-follow LFTs, NPO for now                 FEN:   Monitor lytes and replace as needed  Will recheck all labs now  :   MABEL-2/2 arrest and low flow state, unknown if there is a component of CKD-will continue with IV fluids, trend renal indices, follow urine output closely  Hematuria-noted prior to cath-2/2 traumatic rodriguez placement, rodriguez replaced after bladder scan showed 382ml in bladder    Urine now punch colored and appears to be clearing                 ID:   Leukocytosis-likely reactive in nature-Monitor WBC and fever curve  Elevated anion gap metabolic [id number]-5/7 arrest, will follow labs, should improve with fluids                 Heme: Hold chemical DVT ppx while on Integrilin and in light of hematuria  Will reassess after Integrilin off                 Endo:   Glucose elevated on BMP, likely undiagnosed diabetes-will order accuchecks and SSI, christine check HgbA1c                 Msk/Skin:   Freq turning and repositioning                 Disposition:   The patient will be placed in inpatient status as he will require a greater than 2 midnight LOS  The patient will eb admitted to the ICU  The patient will be a full code  VTE Pharmacologic Prophylaxis: RX contraindicated due to: Integrilin drip  VTE Mechanical Prophylaxis: sequential compression device     Invasive lines and devices: Invasive Devices     Peripheral Intravenous Line            Peripheral IV 04/28/18 Right Antecubital less than 1 day          Line            Arterial Sheath 6 Fr  Right Femoral -- days          Drain            Urethral Catheter less than 1 day          Airway            ETT  less than 1 day                 Code Status: No Order  POA:    POLST:       Given critical illness, patient length of stay will require greater than two midnights  Counseling / Coordination of Care  Total Critical Care time spent 38 minutes excluding procedures, teaching and family updates  Portions of the record may have been created with voice recognition software  Occasional wrong word or "sound a like" substitutions may have occurred due to the inherent limitations of voice recognition software  Read the chart carefully and recognize, using context, where substitutions have occurred          Pierce Whatleyh

## 2018-04-28 NOTE — PROCEDURES
Arterial Line Insertion  Date/Time: 4/28/2018 4:00 PM  Performed by: Deep Romero  Authorized by: Deep Romero     Consent:     Consent obtained:  Emergent situation  Universal protocol:     Imaging studies available: yes      Required blood products, implants, devices, and special equipment available: yes      Immediately prior to procedure a time out was called: yes      Patient identity confirmed:  Hospital-assigned identification number  Indications:     Indications: hemodynamic monitoring, continuous blood pressure monitoring and frequent labs / infusion    Pre-procedure details:     Skin preparation:  Chlorhexidine    Preparation: Patient was prepped and draped in sterile fashion    Anesthesia (see MAR for exact dosages): Anesthesia method:  None  Procedure details:     Location / Tip of Catheter:  Radial    Laterality:  Left    Haider's test performed: yes      Haider's test abnormal: no      Needle gauge:  20 G    Placement technique:  Ultrasound guided    Number of attempts:  2    Successful placement: yes      Transducer: waveform confirmed    Post-procedure details:     Post-procedure:  Secured with tape, sterile dressing applied and sutured    CMS:  Unchanged    Patient tolerance of procedure:   Tolerated well, no immediate complications

## 2018-04-28 NOTE — PROCEDURES
Central Line Insertion  Date/Time: 4/28/2018 5:30 PM  Performed by: Karina Vidales  Authorized by: Karina Vidales     Patient location:  Bedside  Other Assisting Provider: Yes (comment) (Dr Dimitry Jacob)    Consent:     Consent obtained:  Emergent situation  Universal protocol:     Imaging studies available: yes      Required blood products, implants, devices, and special equipment available: yes      Immediately prior to procedure, a time out was called: yes      Patient identity confirmed: Anonymous protocol, patient vented/unresponsive and hospital-assigned identification number  Pre-procedure details:     Hand hygiene: Hand hygiene performed prior to insertion      Sterile barrier technique: All elements of maximal sterile technique followed      Skin preparation:  2% chlorhexidine    Skin preparation agent: Skin preparation agent completely dried prior to procedure    Indications:     Central line indications: medications requiring central line and hemodynamic monitoring    Anesthesia (see MAR for exact dosages): Anesthesia method:  Local infiltration    Local anesthetic:  Lidocaine 1% w/o epi  Procedure details:     Location:  Right internal jugular    Vessel type: vein      Laterality:  Right    Approach: percutaneous technique used      Patient position:  Trendelenburg    Catheter type:  Triple lumen 16cm    Landmarks identified: yes      Ultrasound guidance: yes      Sterile ultrasound techniques: Sterile gel and sterile probe covers were used      Number of attempts:  3  Post-procedure details:     Post-procedure:  Line sutured and dressing applied    Patient tolerance of procedure: Tolerated well, no immediate complications  Comments:      Wire visualized with ultrasound guidance in the vessel  CXR pending

## 2018-04-28 NOTE — ED PROVIDER NOTES
History  No chief complaint on file  Patient is a 27-year-old male  He was out hunting  He has no prior cardiac history  He is not a smoker  Family admits that he really does not go to the doctor  He was in the  back as a passenger when he lost consciousness  Son called the ambulance  Ambulance found patient in cardiac arrest   They started CPR  Down time was approximately 5 minutes  Patient was initially in ventricular fibrillation  He was shocked into PDA  He received epinephrine  He went back into a ventricular arrhythmia  He was shocked again  This time there was return of spontaneous circulation  Patient presented and sinus tachycardia with a blood pressure  He was unresponsive  Patient was intubated pre-hospital             None       No past medical history on file  No past surgical history on file  No family history on file  I have reviewed and agree with the history as documented  Social History   Substance Use Topics    Smoking status: Not on file    Smokeless tobacco: Not on file    Alcohol use Not on file        Review of Systems   Unable to perform ROS: Patient unresponsive       Physical Exam  ED Triage Vitals   Temperature Pulse Respirations Blood Pressure SpO2   04/28/18 1259 04/28/18 1254 04/28/18 1254 04/28/18 1254 04/28/18 1254   (!) 92 8 °F (33 8 °C) 92 22 98/54 99 %      Temp Source Heart Rate Source Patient Position - Orthostatic VS BP Location FiO2 (%)   04/28/18 1259 -- -- -- --   Probe          Pain Score       --                  Orthostatic Vital Signs  Vitals:    04/28/18 1254 04/28/18 1259 04/28/18 1309 04/28/18 1311   BP: 98/54 100/55 95/51 100/66   Pulse: 92 86 81 79       Physical Exam   Constitutional:   Unresponsive male   HENT:   Head: Normocephalic and atraumatic  Eyes:   Pupils constricted and equal    Neck: Normal range of motion  Neck supple  Cardiovascular: Intact distal pulses  Exam reveals no gallop      No murmur heard  Tachycardic regular rate and rhythm  Pulmonary/Chest:   Patient is intubated  Breath sounds are equal  Lungs are clear  Abdominal: Soft  He exhibits no distension  Musculoskeletal: He exhibits no edema or deformity  Neurological:   Patient is unresponsive  [place] coma Scale is 3  Skin: Skin is warm and dry  Psychiatric:   Unable to assess  Vitals reviewed  ED Medications  Medications   amiodarone 300 mg in dextrose 5 % 100 mL IV bolus (not administered)   amiodarone (CORDARONE) 900 mg in dextrose 5 % 500 mL infusion (not administered)   heparin (porcine) injection 4,000 Units (not administered)   heparin (porcine) 25,000 units in 250 mL infusion (premix) (not administered)   heparin (porcine) injection 4,000 Units (not administered)   heparin (porcine) injection 2,000 Units (not administered)   aspirin rectal suppository 600 mg (600 mg Rectal Given 4/28/18 1257)       Diagnostic Studies  Results Reviewed     Procedure Component Value Units Date/Time    Lactic acid, plasma [[id number]] Collected:  04/28/18 1250    Lab Status: In process Specimen:  Blood Updated:  04/28/18 [facility] [[id number]] Collected:  04/28/18 1250    Lab Status: In process Specimen:  Blood Updated:  04/28/18 1303    APTT [[id number]] Collected:  04/28/18 1250    Lab Status: In process Specimen:  Blood Updated:  04/28/18 1303    Comprehensive metabolic panel [[id number]] Collected:  04/28/18 1250    Lab Status: In process Specimen:  Blood Updated:  04/28/18 1303    B-type natriuretic peptide [[id number]] Collected:  04/28/18 1250    Lab Status: In process Specimen:  Blood Updated:  04/28/18 1303    Magnesium [[id number]] Collected:  04/28/18 1250    Lab Status: In process Specimen:  Blood Updated:  04/28/18 1303    Troponin I [[id number]] Collected:  04/28/18 1250    Lab Status: In process Specimen:  Blood Updated:  04/28/18 1302    CBC and differential [[id number]] Collected:  04/28/18 1250    Lab Status:   In process Specimen:  Blood Updated:  04/28/18 1302    POCT troponin [[id number]] Collected:  04/28/18 1246    Lab Status:  Final result Updated:  04/28/18 1300     POC Troponin I 0 01 ng/ml      Specimen Type VENOUS    Narrative:         [employer] i-Stat handheld analyzer 99% cutoff is > 0 08ng/mL in network Emergency Departments    o cTnI 99% cutoff is useful only when applied to patients in the clinical setting of myocardial ischemia  o cTnI 99% cutoff should be interpreted in the context of clinical history, ECG findings and possibly cardiac imaging to establish correct diagnosis  o cTnI 99% cutoff may be suggestive but clearly not indicative of a coronary event without the clinical setting of myocardial ischemia  Blood gas, arterial [[id number]]     Lab Status:  No result Specimen:  Blood, Arterial     POCT Chem 8+ [[id number]]  (Abnormal) Collected:  04/28/18 1248    Lab Status:  Final result Updated:  04/28/18 1255     SODIUM, I-STAT 142 mmol/l      Potassium, i-STAT 3 1 (L) mmol/L      Chloride, istat 108 mmol/L      CO2, i-STAT 18 (L) mmol/L      Anion Gap, Istat 21 (H) mmol/L      Calcium, Ionized i-STAT 1 12 mmol/L      BUN, I-STAT 22 mg/dl      Creatinine, i-STAT 1 6 (H) mg/dl      eGFR 42 ml/min/1 73sq m      Glucose, i-STAT 319 (H) mg/dl      Hct, i-STAT 41 %      Hgb, i-STAT 13 9 g/dl      Specimen Type VENOUS    Fingerstick Glucose (POCT) [[id number]]  (Abnormal) Collected:  04/28/18 1249    Lab Status:  Final result Updated:  04/28/18 1251     POC Glucose 253 (H) mg/dl                  XR chest 1 view portable    (Results Pending)              Procedures  ECG 12 Lead Documentation  Date/Time: 4/28/2018 1:20 PM  Performed by: Raul Chowdhury  Authorized by: Raul Chowdhury     Comments:      EKG shows a sinus tachycardia  There is an anterior STEMI             Phone Contacts  ED Phone Contact    ED Course                               MDM  Number of Diagnoses or Management Options  Diagnosis management comments: STEMI called  Consulted with Cardiology  Cardiology on the way in for PTCA  Aspirin, heparin and amiodarone administered  Held [place] as per Cardiology  Consulted with ICU  ICU at bedside  Patient dropped his blood pressure into the 90 systolic  IV fluid bolus ordered  Consulted with family  Patient to cath lab  Amount and/or Complexity of Data Reviewed  Clinical lab tests: ordered and reviewed  Tests in the radiology section of CPT®: ordered and reviewed  Independent visualization of images, tracings, or specimens: yes      The patient presented with a condition in which there was a high probability of imminent or life-threatening deterioration, and critical care services (excluding separately billable procedures) totalled 30-74 minutes (60 minutes excluding procedure time  )  Disposition  Final diagnoses:   None     ED Disposition     None      Follow-up Information    None       Patient's Medications    No medications on file     No discharge procedures on file      ED Provider  Electronically Signed by           Gina Munoz MD  04/28/18 6626

## 2018-04-29 PROBLEM — G93.1 ACUTE ANOXIC ENCEPHALOPATHY (HCC): Status: ACTIVE | Noted: 2018-01-01

## 2018-04-29 NOTE — PROGRESS NOTES
5mg of morphine wasted with Myesha Aguayo RN  Medication pulled from [address] as an inventory pull

## 2018-04-29 NOTE — PLAN OF CARE
Problem: DISCHARGE PLANNING - CARE MANAGEMENT  Goal: Discharge to post-acute care or home with appropriate resources  INTERVENTIONS:  - Conduct assessment to determine patient/family and health care team treatment goals, and need for post-acute services based on payer coverage, community resources, and patient preferences, and barriers to discharge  - Address psychosocial, clinical, and financial barriers to discharge as identified in assessment in conjunction with the patient/family and health care team  - Arrange appropriate level of post-acute services according to patients   needs and preference and payer coverage in collaboration with the physician and health care team  - Communicate with and update the patient/family, physician, and health care team regarding progress on the discharge plan  - Arrange appropriate transportation to post-acute venues  Outcome: Progressing  CM attempted to speak to pt at bedside, but he is intubated and unresponsive  ICU physicians are going to be talking to family about hospice care  CM will continue to follow

## 2018-04-29 NOTE — PROGRESS NOTES
Critical Care Medicine:  Significant Event  [facility]  /  04/29/18    Patient's family arrives  Son Anmol Johnson states the rest of the family has elected to remain at home  Per discussion we had started last night, family has chosen to withdrawal life support in favor of comfort measures due to his continued deterioration in status  Patient extubated at 4:20 a m  this morning, with morphine for respiratory distress  Propofol continued for myoclonus suppression  Patient remains comfortable at 5:30 a m  this morning  Emotional support provided to the family  No pastoral care needs          OMARI Robert

## 2018-04-29 NOTE — PROGRESS NOTES
Progress Note - Critical Care   Cheyenne Washburn 68 y o  male MRN: [id number]  Unit/Bed#:  Encounter: [id number]    Attending Physician: Regis Sosa DO      ______________________________________________________________________  Assessment and Plan:   Principal Problem:    Cardiac arrest [facility])  Active Problems:    Lactic acidosis    Acute respiratory failure with hypoxia and hypercapnia (HCC)    MABEL (acute kidney injury) ([facility] [address] )    Acute anoxic encephalopathy (HCC)    Transaminitis    High anion gap metabolic acidosis  Resolved Problems:    * No resolved hospital problems  *        Neuro:  Place patient on propofol to control myoclonic status epilepticus overnight  Continue AEDs, VPA started overnight  Discussed with Neurology  Prognosis for meaningful neurologic recovery grim  Continue serial neuro exams  CV:  Becoming bradycardic and hypotensive, despite Levophed  Temperature also falling despite warming measures  Target temperature of 36° C becoming difficult to achieve  Continue Brilinta and amiodarone at 1 mg/min  Pulm: Follow ABGs, CXR as needed  Unable to wean ventilator this morning  Bronchodilators as needed  VAP prophylaxis  GI:  Ppi for GI prophylaxis  :  Continue to monitor urine output and BUN and creatinine  F/E/N:  Continue fluids and monitor lactates  Monitor electrolytes and replete as needed  NPO for now  ID:  Continue to monitor temperature and leukocyte curves  Low suspicion for sepsis at this point  Heme:  Hemoglobin and platelet count stable  Continue to monitor  Endo:  Hourly glucoses with insulin drip per protocol  Msk/Skin:  Turn and reposition q 2 hours with frequent offloading  Disposition:  Maintain ICU level of care  Chase Wright called at 2:30 a m  this morning to advise of this patient's deteriorating status  He requests patient be changed to a code level 3, with holding CPR, ACLS protocol    Upon family's arrival, discussion to be held regarding goals of care  Code Status: Level 1 - Full Code    Counseling / Coordination of Care  Total Critical Care time spent 55 minutes excluding procedures, teaching and family updates  ______________________________________________________________________    24 Hour Events:   Patient deteriorating overnight  Temperature becoming unstable, hypotensive and becoming bradycardic  Myoclonic status controlled with propofol per Neurology  Family gathering at the bedside for goals of care discussion  Patient's code status changed to level 3       ______________________________________________________________________    Physical Exam:   GEN:  Well nourished, well developed, appears stated age  [de-identified]:  Sclera anicteric, mucous membranes pink and moist, conjunctiva pink, no amairani/rhinorrhea, ETT/OGT present and secured  Neck:  No lymphadenopathy, no bruits  CV:  S1S2, no murmurs, rubs or gallops  Weak distal pulses  No JVD  Trace peripheral edema  Resp:  Lungs diminished throughout  No subq air or crepitus  Symmetrical expansion  No cough noted  GI:  Abd soft, nontender, no guarding/rebound, nondistended, abscess bowel sounds X4 quads, no organomegaly appreciated  Neuro:  Pupils fixed at 4 mm, right eye deviates to lateral, dysconjugate gaze, GCS 3T, no cough, gag, corneals    ______________________________________________________________________  Blood pressure 118/65, pulse 58, temperature 97 5 °F (36 4 °C), resp  rate 15, height 6' (1 829 m), weight 107 kg (236 lb 4 8 oz), SpO2 100 %  Temperature:   Temp (24hrs), Av 4 °F (35 8 °C), Min:92 8 °F (33 8 °C), Max:97 5 °F (36 4 °C)    Current Temperature: 97 5 °F (36 4 °C)  Weights:   IBW: 77 6 kg    Body mass index is 32 05 kg/m²    Weight (last 2 days)     Date/Time   Weight    18 1252  107 (236 3)              Non-Invasive/Invasive Ventilation Settings:  Respiratory    Lab Data (Last 4 hours)    None         O2/Vent Data (Last 4 hours) 04/29 0007          Drager Vent Mode AC/VC+       Resp Rate (BPM) (BPM) 22       VT (mL) (mL) 300       Insp Time (S) (S) 0 8       FIO2 (%) (%) 40       PEEP (cmH2O) (cmH2O) 5       Rise Time (%) (%) 0 2       MV (Obs) 5 35                 Lab Results   Component Value Date    PHART 7 405 04/28/2018    FTK2XHG 27 7 (LL) 04/28/2018    PO2ART 82 8 04/28/2018    JTL8NZK 17 0 (L) 04/28/2018    BEART -6 2 04/28/2018    SOURCE Line, Arterial 04/28/2018     SpO2: SpO2: 100 %  Intake and Outputs:  I/O       04/27 0701 - 04/28 0700 04/28 0701 - 04/29 0700    I V  (mL/kg)  1210 6 (11 3)    NG/GT  60    IV Piggyback  1450    Total Intake(mL/kg)  2720 6 (25 4)    Urine (mL/kg/hr)  1175    Emesis/NG output  150    Total Output   1325    Net   +1395 6                Nutrition:        Diet Orders            Start     Ordered    04/28/18 1538  Diet NPO  Diet effective now     Question Answer Comment   Diet Type NPO    RD to adjust diet per protocol?  No        04/28/18 1540          Labs:   Pending      Imaging:  None today  EKG:  Sinus Abner on cardiac monitor  Micro:  No results found for: Rose Marie Limaer, WOUNDCULT, SPUTUMCULTUR  Allergies: No Known Allergies  Medications:   Scheduled Meds:  Current Facility-Administered Medications:  amiodarone 1 mg/min Intravenous Continuous Grace Peralta MD Last Rate: 1 mg/min (04/28/18 1333)   aspirin 162 mg Oral Daily Colin Rodríguez MD    chlorhexidine 15 mL Swish & Spit Q12H [facility] OMARI Doss    insulin regular (HumuLIN R,NovoLIN R) infusion 0 3-21 Units/hr Intravenous Titrated Rachael Halon, CRNP Last Rate: 0 5 Units/hr (04/29/18 0030)   levETIRAcetam 1,000 mg Intravenous Q12H [facility] OMARI Hinojosa    LORazepam 2 mg Intravenous Once [employer], DO    norepinephrine 1-30 mcg/min Intravenous Titrated Rachael Halon, CRNP Last Rate: 12 mcg/min (04/29/18 6058)   omeprazole (PRILOSEC) suspension 2 mg/mL 20 mg Oral Daily ISE, OMARI    ondansetron 4 mg Intravenous Q6H PRN Tiburcio Denny MD    propofol 5-50 mcg/kg/min Intravenous Titrated OMARI Helton Last Rate: 50 mcg/kg/min (04/29/18 0136)   sodium chloride 75 mL/hr Intravenous Continuous Tiburcio Denny MD Last Rate: 75 mL/hr (04/28/18 1743)   ticagrelor 90 mg Oral BID Tiburcio Denny MD    valproate sodium 250 mg Intravenous Q8H [address] OMARI Hinojosa      Continuous Infusions:  amiodarone 1 mg/min Last Rate: 1 mg/min (04/28/18 1333)   insulin regular (HumuLIN R,NovoLIN R) infusion 0 3-21 Units/hr Last Rate: 0 5 Units/hr (04/29/18 0030)   norepinephrine 1-30 mcg/min Last Rate: 12 mcg/min (04/29/18 0245)   propofol 5-50 mcg/kg/min Last Rate: 50 mcg/kg/min (04/29/18 0136)   sodium chloride 75 mL/hr Last Rate: 75 mL/hr (04/28/18 1743)     PRN Meds:    ondansetron 4 mg Q6H PRN     VTE Pharmacologic Prophylaxis: Brilinta  VTE Mechanical Prophylaxis: sequential compression device  Invasive lines and devices: Invasive Devices     Central Venous Catheter Line            CVC Central Lines 04/28/18 Triple 16cm less than 1 day          Peripheral Intravenous Line            Peripheral IV 04/28/18 Left Antecubital less than 1 day    Peripheral IV 04/28/18 Right Antecubital less than 1 day          Arterial Line            Arterial Line 04/28/18 Radial less than 1 day          Drain            NG/OG/Enteral Tube less than 1 day    Urethral Catheter Temperature probe 16 Fr  less than 1 day          Airway            ETT  less than 1 day                     Portions of the record may have been created with voice recognition software  Occasional wrong word or "sound a like" substitutions may have occurred due to the inherent limitations of voice recognition software  Read the chart carefully and recognize, using context, where substitutions have occurred      OMARI Helton

## 2018-04-29 NOTE — PROGRESS NOTES
Critical Care Medicine:  Significant Event  [facility]  /  04/28/18    Patient with persistent myoclonus and status epilepticus  Loaded with Keppra and VPA without response  Ativan given with minimal affect  Patient pulling large tidal volumes at a high rate, creating a minute volume around 20-25 L  Weak corneals, fixed pupils at 3mm, decerebrate posturing, jaw clonus, abdominal myoclonus, no gag nor cough reflex  Right eye gazes lateral as left eye remains straight  No nystagmus  Suspect anoxic insult and possible vasogenic cerebral edema  Discussed with Dr Sae Kolb  I contacted Dr Krystle Clements from neurology to determine need for EMU monitoring  He feels the monitoring is not necessary due to symptoms representing anoxic encephalopathy, and risk benefit of transfer does not favor moving him for same  Plan to use propofol to control status, with an upper limit of 50mcg/kg/min  If myoclonus continues, intermittent rocuronium  Levophed if required for pressor support  Continue to monitor neuro status      Critical care time additional 25 minutes for care coordination and acute changes in status deemed life-threatening        OMARI Gonzalez

## 2018-04-29 NOTE — CASE MANAGEMENT
Initial Clinical Review    Admission: Date/Time/Statement: 4/28/18 @ 1323     Orders Placed This Encounter   Procedures    Inpatient Admission (expected length of stay for this patient is greater than two midnights)     Standing Status:   Standing     Number of Occurrences:   1     Order Specific Question:   Admitting Physician     Answer:   Ashish Gustafson     Order Specific Question:   Level of Care     Answer:   Critical Care [15]     Order Specific Question:   Estimated length of stay     Answer:   More than 2 Midnights     Order Specific Question:   Certification     Answer:   I certify that inpatient services are medically necessary for this patient for a duration of greater than two midnights  See H&P and MD Progress Notes for additional information about the patient's course of treatment  ED: Date/Time/Mode of Arrival:   ED Arrival Information     Expected Arrival Acuity Means of Arrival Escorted By Service Admission Type    - 4/28/2018 12:28 - [facility] [place] [facility] Critical Care/ICU Emergency    Arrival Complaint    -          Chief Complaint: No chief complaint on file  History of Illness: *  ED Vital Signs: Carter Unger is a 68 y o  male who presents to the ED via EMS after a witnessed cardiac arrest   Per his family's report he had been out hunting and he was complaining of feeling tired  He had been in his normal state of health up until that point  He was able to get out of the woods and into the car on his own  His son reports that he seemed to be "in and out of it" and was repeating things and not making sense  He then clenched his left arm and became unconscious  His son pulled over and called 46  When EMS arrived they immediately started CPR and he was found to be in [facility]  ACLS protocol was followed and he had return of ROSC by arrival to the ED  EKG showed a STEMI and he was taken emergently to the cath lab    He was found to have an LAD lesion and a stent was placed  He was transferred to the ICU post procedure  Per his family he has no known medical problems, but does not seek medical care      ED Triage Vitals   Temperature Pulse Respirations Blood Pressure SpO2   04/28/18 1259 04/28/18 1254 04/28/18 1254 04/28/18 1254 04/28/18 1254   (!) 92 8 °F (33 8 °C) 92 22 98/54 99 %      Temp Source Heart Rate Source Patient Position - Orthostatic VS BP Location FiO2 (%)   04/28/18 1259 -- 04/28/18 1515 04/28/18 1515 --   Probe  Lying Left arm       Pain Score       --               Wt Readings from Last 1 Encounters:   04/28/18 107 kg (236 lb 4 8 oz)       Vital Signs (abnormal):Vital Signs (last 2 days)     Date/Time Temp Pulse Resp BP MAP (mmHg) Arterial Line BP MAP SpO2 O2 Device Patient Position - Orthostatic VS   04/29/18 0800 -- 66  25 -- -- -- --  87 % -- --   04/29/18 0600 -- 80  29 -- -- 50/28 35 mmHg  62 % -- --   04/29/18 0500 -- 73  38 -- -- 37/24 28 mmHg  65 % -- --   04/29/18 0415 -- -- -- -- -- -- -- 98 % -- --   04/29/18 0400  95 7 °F (35 4 °C)  54  25 -- -- 93/47 62 mmHg 99 % -- --   04/29/18 0300  95 °F (35 °C) 56 19 -- -- 86/43 55 mmHg 98 % -- --   04/29/18 0200  95 4 °F (35 2 °C) 58 15 -- -- 74/40 50 mmHg 100 % -- --   04/29/18 0100  95 7 °F (35 4 °C) 61  28 -- -- 78/42 53 mmHg 99 % -- --   04/29/18 0007 -- -- -- -- -- -- -- 97 % -- --   04/29/18 0000  96 8 °F (36 °C) 74  30 -- -- 71/37 47 mmHg 96 % -- --   04/28/18 2300 --  109  36 -- -- 139/61 86 mmHg 92 % -- --   04/28/18 2200 -- 93  52 -- -- 136/62 86 mmHg 95 % -- --   04/28/18 2100 -- 90 22 -- -- 138/62 87 mmHg 96 % -- --   04/28/18 2009 -- 84  25 -- -- -- -- 99 % -- --   04/28/18 2000 -- 83  26 -- -- 127/60 82 mmHg 98 % -- --   04/28/18 1930 -- 80  27 -- -- 122/59 79 mmHg 99 % -- --   04/28/18 1915 -- 84  24 -- -- 129/63 85 mmHg 100 % -- --   04/28/18 1900 -- 82  24 -- -- 121/60 80 mmHg 100 % -- --   04/28/18 1845 97 5 °F (36 4 °C) 79  24 -- -- 125/60 80 mmHg 99 % -- --   04/28/18 1830 97 5 °F (36 4 °C) 78  23 -- -- 123/59 79 mmHg 99 % -- --   04/28/18 1815  97 3 °F (36 3 °C) 79  40 -- -- 128/64 83 mmHg 99 % -- --   04/28/18 1800  97 2 °F (36 2 °C) 84 18 -- -- 144/70 95 mmHg 98 % -- --   04/28/18 1745  97 °F (36 1 °C) 79  25 -- -- 135/66 89 mmHg 99 % -- --   04/28/18 1730  96 8 °F (36 °C) 79  35 -- -- 134/64 86 mmHg 97 % -- --   04/28/18 1715  96 8 °F (36 °C) 77  27 -- -- 137/67 89 mmHg 100 % -- --   04/28/18 1700 -- 77  25 -- -- 126/61 82 mmHg 97 % -- --   04/28/18 1645 -- 72 21 -- -- 124/58 78 mmHg 95 % -- --   04/28/18 1630 -- 71 21 -- -- 129/62 83 mmHg 94 % -- --   04/28/18 1629 -- 71  26 -- -- -- -- 94 % -- --   04/28/18 1615  96 1 °F (35 6 °C) 72  23 -- -- -- -- 98 % -- --   04/28/18 1530  95 2 °F (35 1 °C) 70 22 118/65 87 127/64 86 mmHg 100 % -- --   04/28/18 1515 -- 70 22 120/65 87 -- -- 99 % Other (comment) Lying   04/28/18 1320 -- -- -- 100/55 -- -- -- -- -- --   04/28/18 13:11:40 -- 79  24 100/66 -- -- -- 100 % -- --   04/28/18 13:09:52 -- 81  24 95/51 -- -- -- 100 % -- --   04/28/18 12:59:12 -- 86 22 100/55 -- -- -- 99            Abnormal Labs/Diagnostic Test Results: lactic acid  8 5, BNP  153  Sodium 142 136 - 145 mmol/L      Potassium 3 1 (L) 3 5 - 5 3 mmol/L     Chloride 106 100 - 108 mmol/L     CO2 16 (L) 21 - 32 mmol/L     Anion Gap 20 (H) 4 - 13 mmol/L     BUN 23 5 - 25 mg/dL     Creatinine 2 00 (H) 0 60 - 1 30 mg/dL     Comment: Standardized to IDMS reference method       Glucose 326 (H) 65 - 140 mg/dL     Comment:   If the patient is fasting, the ADA then defines impaired fasting glucose as > 100 mg/dL and diabetes as > or equal to 123 mg/dL  Specimen collection should occur prior to Sulfasalazine administration due to the potential for falsely depressed results  Specimen collection should occur prior to Sulfapyridine administration due to the potential for falsely elevated results         Calcium 8 3 8 3 - 10 1 mg/dL     AST 88 (H) 5 - 45 U/L     Comment:   Specimen collection should occur prior to Sulfasalazine administration due to the potential for falsely depressed results  ALT 91 (H) 12 - 78 U/L     Comment:   Specimen collection should occur prior to Sulfasalazine administration due to the potential for falsely depressed results  Alkaline Phosphatase 103 46 - 116 U/L     Total Protein 6 3 (L) 6 4 - 8 2 g/dL     Albumin 3 3 (L) 3 5 - 5 0 g/dL     Total Bilirubin 0 50 0 20 - 1 00 mg/dL     eGFR 32 ml/min/1 73sq     PT INR15 5  1 21, wbc 14 77  CT head- wnl , EKG EKG shows a sinus tachycardia  Racquelenrike Balderas is an anterior STEMI    CXR -wnl   ED Treatment:   Medication Administration from 04/28/2018 1228 to 04/28/2018 1513       Date/Time Order Dose Route Action Action by Comments     04/28/2018 1257 aspirin rectal suppository 600 mg 600 mg Rectal Given Yevgeniy Shetty RN      04/28/2018 1333 amiodarone (CORDARONE) 900 mg in dextrose 5 % 500 mL infusion 1 mg/min Intravenous New Bag RENEE Herrera in the Cardiac Cath Lab     04/28/2018 1333 amiodarone (CORDARONE) 900 mg in dextrose 5 % 500 mL infusion 1 mg/min Intravenous New Bag Yousif Wetzel MD      04/28/2018 1353 lidocaine (XYLOCAINE) 1 % injection 10 mL Infiltration Given Yousif Wetzel MD right groin     04/28/2018 1402 bivalirudin (ANGIOMAX) bolus from bag 16 mL Intravenous Given Jm Perla RN      04/28/2018 1439 bivalirudin (ANGIOMAX) 250 mg in sodium chloride 0 9 % 50 mL infusion 0  Intravenous Stopped Cj Julian      04/28/2018 1403 bivalirudin (ANGIOMAX) 250 mg in sodium chloride 0 9 % 50 mL infusion 1 729 mg/kg/hr Intravenous [address] Jm Perla RN      04/28/2018 1416 eptifibatide (INTEGRILIN) 20 MG/10ML (premix) 9 5 mL Intravenous Given Jm Perla RN      04/28/2018 1405 eptifibatide (INTEGRILIN) 20 MG/10ML (premix) 9 5 mL Intravenous Given Jm Perla RN      04/28/2018 1412 eptifibatide (INTEGRILIN) 75 mg in 100 mL infusion (premix) 2 mcg/kg/min Intravenous [address] [facility] Claude Dove RN      04/28/2018 1437 iohexol (OMNIPAQUE) 350 MG/ML injection (SINGLE-DOSE) 180 mL Intravenous Given Herberth Gallegos RN      04/28/2018 1437 ticagrelor (BRILINTA) tablet 180 mg Oral Given Herberth Gallegos RN           Past Medical/Surgical History: Active Ambulatory Problems     Diagnosis Date Noted    No Active Ambulatory Problems     Resolved Ambulatory Problems     Diagnosis Date Noted    No Resolved Ambulatory Problems     No Additional Past Medical History       Admitting Diagnosis: Cardiac arrest ([facility] [address] ) [I46 9]  [facility] [I46 9]  STEMI (ST elevation myocardial infarction) ([facility] ) [I21 3]    Age/Sex: 68 y o  male    Assessment/Plan: Assessment:   1  S/p Cardiac arrest-vfib   2  Acute resp failure with hypoxia and hypercapnia   3  MABEL  4  Lactic acidosis  5  Acute encephalopathy  6  Transaminitis  7  Elevated anion gap metabolic acidosis  8  STEMI  9  Leukocytosis        Plan:                  Neuro:   Acute encephalopathy-2/2 cardiac arrest-will initiate targeted temperature management-goal temp 36 degrees, monitor neuro status closely, serial neuro exams  Noted on arrival to the ICU to have myoclonic jerks of his left arm  Also noted to have upward and right lateral gaze preference  Stat CT head ordered  [facility] ICU  Sleep hygiene                 CV:   s/p Vfib cardiac arrest/STEMI-had ROSC upon arrival to ED  Taken emergently to cath lab, had stent placed to LAD  Transferred to ICU on Cangrelor and Integrilin, will stop cangrelor  Received dose of Brilinta in the cath lab as well  Continue AMio drip                  Lung:   Acute hypoxic and hypercarbic resp failure-2/2 cardiac arrest-continue mechanical ventilation for now, will check ABG and make vent changes accordingly                 GI:   Transaminitis-22/ shock liver from arrest-follow LFTs, NPO for now                 FEN:   Monitor lytes and replace as needed    Will recheck all labs now                  : MABEL-2/2 arrest and low flow state, unknown if there is a component of CKD-will continue with IV fluids, trend renal indices, follow urine output closely  Hematuria-noted prior to cath-2/2 traumatic rodriguez placement, rodriguez replaced after bladder scan showed 382ml in bladder  Urine now punch colored and appears to be clearing                 ID:   Leukocytosis-likely reactive in nature-Monitor WBC and fever curve  Elevated anion gap metabolic IFDKPPIW-3/7 arrest, will follow labs, should improve with fluids                 Heme:   Hold chemical DVT ppx while on Integrilin and in light of hematuria  Will reassess after Integrilin off                 Endo:   Glucose elevated on BMP, likely undiagnosed diabetes-will order accuchecks and SSI, christine check HgbA1c                 Msk/Skin:   Freq turning and repositioning                 Disposition:   The patient will be placed in inpatient status as he will require a greater than 2 midnight LOS  The patient will eb admitted to the ICU  The patient will be a full code       VTE Pharmacologic Prophylaxis: RX contraindicated due to: Integrilin drip  VTE Mechanical Prophylaxis: sequential compression device     Invasive lines and devices: Invasive Devices            Peripheral Intravenous Line                     Peripheral IV 04/28/18 Right Antecubital less than 1 day                   Line                     Arterial Sheath 6 Fr   Right Femoral -- days                   Drain                     Urethral Catheter less than 1 day              Airway                     ETT  less than 1 day              Given critical illness, patient length of stay will require greater than two midnights            Admission Orders:  Scheduled Meds:   Current Facility-Administered Medications:  LORazepam 1 mg Intravenous Q1H PRN Carlean Gazella, CRNP    morphine 4 mg/hr Intravenous Continuous Carlean Gazella, CRNP Last Rate: 2 mg/hr (04/29/18 0980)   morphine injection 2 mg Intravenous Q1H PRN Mary Urban CRNP    scopolamine 1 patch Transdermal Q72H Mary Urban, CRNP      Continuous Infusions:   morphine 4 mg/hr Last Rate: 2 mg/hr (04/29/18 0949)     PRN Meds:   LORazepam    morphine injection     Fingerstick q6h  Neuro consult   SCD  Suction   Oral care   End of life comfort   NG LIS  Neuro checks  Neuro vascular checks  Cardiology cosnult   I&O   Daily weight     Pt was extubated @ 2967 on 4/29    Cardiology consult  4/28    Acute MI type 1 with ST elevation in precordial leads leading to cardiac arrest status post CPR and defibrillation, currently intubated  Not sure patient has underlying anoxic brain encephalopathy  2   Ventricle dependent respiratory failure secondary to above  3  Status post cardiac arrest   4  Coronary artery disease status post catheterization with 100% occluded LAD which was successfully stented with a non drug coated stents  5   Hypokalemia  6  CKD, previous serum creatinine numbers not known    Cardiac cath 4/28  1ST LESION INTERVENTIONS:  A stent procedure was performed on the lesion in the mid LAD  A Vision Rx 3 5 X 28mm bare-metal stent was placed across the lesion and deployed at a maximum inflation pressure of 9 annie      2ND LESION INTERVENTIONS:  A stent procedure was performed on the lesion in the distal LAD  A Vision Rx 3 0 X 12mm bare-metal stent was placed across the lesion and deployed at a maximum inflation pressure of 9 annie      IMPRESSIONS:  Left ventricular function is abnormal, EF 30 to 35 % with anteriapical akiesis      RECOMMENDATIONS:  Continue ASA, brillinta for at least 6 month to year as patient has non coated stents

## 2018-04-29 NOTE — SOCIAL WORK
CM attempted to speak to pt at bedside, but he is intubated and unresponsive  ICU physicians are going to be talking to family about hospice care  AISLINN will continue to follow

## 2018-04-29 NOTE — PROGRESS NOTES
10mg morphine withdrawn from accudose by supervisor as override   Administered 5mg to pt and wasted 5 mg

## 2018-04-29 NOTE — TELEMEDICINE
I was called last evening on this patient by the ICU team   Patient is a 77-year-old gentleman who came in with cardiac arrest, resuscitated, and in the ICU was noted to have myoclonic jerking affecting the trunk and the upper and lower extremities, with a GCS of 4  Patient was on mechanical ventilation and as per the team had received Keppra loading dose as well as valproate and there was a concern if the patient should be transferred to [place] to the epilepsy monitoring unit  CT scan of the brain was unremarkable and based on what is described it was felt that the patient was in severe anoxic/hypoxic encephalopathy with myoclonus, overall prognosis was felt to be guarded, and based on my discussion with the team, I advised the team that it was not necessary to transfer the patient to the EMU and instead continue present management and if patient witnessed to have further seizures, although it was unclear that the patient was having seizures rather than myoclonic jerks as a result of his encephalopathy  Advised the team to further continue with the status protocol including neuromuscular blockers, and hypothermia protocol

## 2018-04-30 NOTE — DISCHARGE SUMMARY
Discharge Summary   Prosper Andrade 68 y o  male MRN: [id number]    [address] ICU Room / Bed: / Encounter: [id number]      Admitting Provider: Desmond Myers DO  Discharge Provider: Desmond Myers DO  Admission Date: 4/28/2018     Discharge Date: No discharge date for patient encounter  Primary Care Physician at Discharge: No primary care provider on file  None    Primary Discharge Diagnosis  Principal Problem:    Cardiac arrest [facility])  Active Problems:    Lactic acidosis    Acute respiratory failure with hypoxia and hypercapnia (HCC)    MABEL (acute kidney injury) ([facility] [address] )    Acute anoxic encephalopathy (HCC)    Transaminitis    High anion gap metabolic acidosis  Resolved Problems:    * No resolved hospital problems  *      Other Problems Addressed  Patient Active Problem List    Diagnosis Date Noted    Cardiac arrest ([facility] ) 04/28/2018    Lactic acidosis 04/28/2018    Acute respiratory [facility] with hypoxia and [facility]capnia [facility] 04/28/2018    MABEL (acute kidney injury) ([facility] ) 04/28/2018    Acute anoxic encephalopathy ([facility] ) 04/28/2018    Transaminitis 04/28/2018    High anion gap metabolic acidosis 29/49/1688       Consulting Providers    Cardiology, Neurology    Diagnostic /therapeutic Procedures Performed   cardiac catheterization with thrombectomy and stenting      Other Pertinent Test Results  Lab Results   Component Value Date    WBC 18 95 (H) 04/29/2018    HGB 11 6 (L) 04/29/2018    HCT 36 5 04/29/2018    MCV 95 04/29/2018     04/29/2018     Lab Results   Component Value Date    GLUCOSE 111 04/29/2018    CALCIUM 7 9 (L) 04/29/2018     04/29/2018    K 3 5 04/29/2018    CO2 19 (L) 04/29/2018     (H) 04/29/2018    BUN 23 04/29/2018    CREATININE 1 48 (H) 04/29/2018     Lab Results   Component Value Date    CALCIUM 7 9 (L) 04/29/2018    PHOS 4 6 (H) 04/29/2018     Lab Results   Component Value Date     04/29/2018    K 3 5 04/29/2018     (H) 04/29/2018    CO2 19 (L) 04/29/2018    ANIONGAP 14 (H) 04/29/2018    BUN 23 04/29/2018    CREATININE 1 48 (H) 04/29/2018    GLUCOSE 111 04/29/2018    CALCIUM 7 9 (L) 04/29/2018     (H) 04/28/2018     (H) 04/28/2018    ALKPHOS 103 04/28/2018    PROT 6 0 (L) 04/28/2018    BILITOT 0 60 04/28/2018    EGFR 46 04/29/2018     Lab Results   Component Value Date    INR 1 20 (H) 04/29/2018    INR 1 31 (H) 04/28/2018    INR 2 28 (H) 04/28/2018    PROTIME 15 5 (H) 04/29/2018    PROTIME 16 6 (H) 04/28/2018    PROTIME 25 8 (H) 04/28/2018     Lab Results   Component Value Date     (H) 04/28/2018     (H) 04/28/2018    ALKPHOS 103 04/28/2018    BILITOT 0 60 04/28/2018     No results found for: TSH, X2VGZWL, D8NLYAQ, THYROIDAB  No results found for: HGBA1C    Presenting Problem/History of Present Illness  Cardiac arrest [facility])    Hospital Course    79-year-old male presented to the emergency room following cardiac arrest after hunting out in the [facility] with his son  Recent history of feeling more fatigued, nauseous, and generally not well  Patient's son reports that during the ride home from hunting, he noted that his father had a jerking movement of his left arm  He summoned EMS, who initiated CPR and ACLS maneuvers  ROSC was obtained, and patient was emergently sent to this cath lab as a STEMI alert  During the catheterization, thrombectomy was performed and a bare metal stent placed in the LAD  Patient's condition stabilized, and did not require pressors or inotropic agents  Patient did not recover neurologically  Patient placed on targeted temperature management for 36° C  Temperature was difficult to manage  Patient with periods of status myoclonus, treated with propofol  Patient became bradycardic and hypotensive  Pressors were started  Family at the bedside opted for comfort measures only  Patient compassionately extubated at 4:20 a m  On 04/29/2018  He was pronounced dead at 2:06 a m  On 2018 by me  Physical Exam  Vitals: Blood pressure 118/65, pulse (!) 117, temperature (!) 95 7 °F (35 4 °C), resp  rate 16, height 6' (1 829 m), weight 107 kg (236 lb 4 8 oz), SpO2 (!) 62 %  ,Body mass index is 32 05 kg/m²  Unresponsive, pupils fixed 4mm, no protective reflexes  Lungs rhonchorous throughout  S1S2 No MRGs  Abd soft  No guarding or rebound, absent bowel sounds  NC/AT  No spontaneous movement of extremities      [place] [facility]  [place] [facility]  Discharged With Lines: no      Medications   See after visit summary for reconciled discharge medications provided to patient and family  Allergies  No Known Allergies    Discharge Condition:       Code Status: Level 4 - Comfort Care  Advance Directive and Living Will: <no information>  Power of :    POLST:      Discharge  Statement   I spent 20 minutes discharging the patient  This time was spent on the day of discharge  I had direct contact with the patient on the day of discharge  Additional documentation is required if more than 30 minutes were spent on discharge

## 2023-01-01 NOTE — PROGRESS NOTES
Critical Care Medicine:  Significant Event  Santiago Rodrigues  /  04/30/18     Called to see patient to pronounce death  Asystolic on monitor, no heart tones  Apneic,   No breath sounds  No response to painful stimuli  Patient pronounced dead at 2:06 a m  Son [facility] advised of death and condolences offered      OMARI Delacruz Statement Selected